# Patient Record
Sex: FEMALE | Race: WHITE | NOT HISPANIC OR LATINO | Employment: FULL TIME | URBAN - METROPOLITAN AREA
[De-identification: names, ages, dates, MRNs, and addresses within clinical notes are randomized per-mention and may not be internally consistent; named-entity substitution may affect disease eponyms.]

---

## 2019-10-21 PROBLEM — G47.01 INSOMNIA DUE TO MEDICAL CONDITION: Status: ACTIVE | Noted: 2017-07-11

## 2019-10-21 PROBLEM — M47.26 OTHER SPONDYLOSIS WITH RADICULOPATHY, LUMBAR REGION: Status: ACTIVE | Noted: 2017-07-11

## 2019-10-21 PROBLEM — F17.200 SMOKING: Status: ACTIVE | Noted: 2017-07-11

## 2019-10-21 PROBLEM — F41.0 PANIC ANXIETY SYNDROME: Status: ACTIVE | Noted: 2017-07-11

## 2019-10-21 PROBLEM — M46.90 INFLAMMATORY SPONDYLOPATHY: Status: ACTIVE | Noted: 2017-07-11

## 2019-10-21 PROBLEM — M51.36 DDD (DEGENERATIVE DISC DISEASE), LUMBAR: Status: ACTIVE | Noted: 2017-07-11

## 2019-10-21 PROBLEM — M51.369 DDD (DEGENERATIVE DISC DISEASE), LUMBAR: Status: ACTIVE | Noted: 2017-07-11

## 2019-10-22 ENCOUNTER — OFFICE VISIT (OUTPATIENT)
Dept: FAMILY MEDICINE | Facility: CLINIC | Age: 59
End: 2019-10-22

## 2019-10-22 VITALS
DIASTOLIC BLOOD PRESSURE: 64 MMHG | HEART RATE: 72 BPM | SYSTOLIC BLOOD PRESSURE: 114 MMHG | HEIGHT: 64 IN | WEIGHT: 142 LBS | BODY MASS INDEX: 24.24 KG/M2

## 2019-10-22 DIAGNOSIS — F41.0 PANIC ANXIETY SYNDROME: Primary | ICD-10-CM

## 2019-10-22 DIAGNOSIS — M46.92 INFLAMMATORY SPONDYLOPATHY OF CERVICAL REGION: ICD-10-CM

## 2019-10-22 DIAGNOSIS — M47.26 OTHER SPONDYLOSIS WITH RADICULOPATHY, LUMBAR REGION: ICD-10-CM

## 2019-10-22 PROBLEM — M47.817 LUMBOSACRAL SPONDYLOSIS WITHOUT MYELOPATHY: Status: ACTIVE | Noted: 2019-10-22

## 2019-10-22 PROCEDURE — 99212 OFFICE O/P EST SF 10 MIN: CPT | Mod: S$GLB,,, | Performed by: FAMILY MEDICINE

## 2019-10-22 PROCEDURE — 99212 PR OFFICE/OUTPT VISIT, EST, LEVL II, 10-19 MIN: ICD-10-PCS | Mod: S$GLB,,, | Performed by: FAMILY MEDICINE

## 2019-10-22 RX ORDER — ALPRAZOLAM 0.5 MG/1
0.5 TABLET ORAL 2 TIMES DAILY PRN
Qty: 60 TABLET | Refills: 5 | Status: SHIPPED | OUTPATIENT
Start: 2019-10-22 | End: 2020-04-28 | Stop reason: SDUPTHER

## 2019-10-22 RX ORDER — ALPRAZOLAM 0.5 MG/1
1 TABLET ORAL 2 TIMES DAILY PRN
Refills: 0 | COMMUNITY
Start: 2019-10-04 | End: 2019-10-22 | Stop reason: SDUPTHER

## 2019-10-22 NOTE — PROGRESS NOTES
"  SUBJECTIVE:    Patient ID: Mily Long is a 59 y.o. female.    Chief Complaint: Regular Check Up    Patient with DDD and insomnia presents for med refill. Has managed to remain tobacco free for the past 11 months. Notes some weight gain during this time  Uses xanax for sleep and ibuprofen for back and neck pain. Has been taking less NSAIDs because she is able to manage her activities better. Does have low back stiffness. nexk has improves with chnaging her pillows an sleep positioning      Past Medical History:   Diagnosis Date    DDD (degenerative disc disease), cervical     DDD (degenerative disc disease), lumbar     Insomnia     Panic anxiety syndrome     Spondyloarthritis      Past Surgical History:   Procedure Laterality Date     SECTION       Family History   Problem Relation Age of Onset    Diabetes Father        Marital Status: Single  Alcohol History:  reports that she drank alcohol.  Tobacco History:  reports that she has quit smoking. She has never used smokeless tobacco.  Drug History:  reports that she does not use drugs.    Review of patient's allergies indicates:  No Known Allergies    Current Outpatient Medications:     ALPRAZolam (XANAX) 0.5 MG tablet, Take 1 tablet (0.5 mg total) by mouth 2 (two) times daily as needed., Disp: 60 tablet, Rfl: 5    Review of Systems   HENT: Negative.    Respiratory: Negative for cough and shortness of breath.    Cardiovascular: Positive for palpitations. Negative for chest pain.   Gastrointestinal: Negative.    Musculoskeletal: Positive for back pain.   Psychiatric/Behavioral: Positive for sleep disturbance. The patient is nervous/anxious.           Objective:      Vitals:    10/22/19 1440   BP: 114/64   Pulse: 72   Weight: 64.4 kg (142 lb)   Height: 5' 3.5" (1.613 m)     Physical Exam   Constitutional: She is oriented to person, place, and time. She appears well-developed and well-nourished.   HENT:   Head: Normocephalic and atraumatic.   Eyes: " Pupils are equal, round, and reactive to light. EOM are normal.   Neck: Normal range of motion.   Cardiovascular: Normal rate.   Pulmonary/Chest: Effort normal.   Musculoskeletal: Normal range of motion. She exhibits no edema.   Neurological: She is alert and oriented to person, place, and time.   Skin: Skin is warm and dry.   Psychiatric: She has a normal mood and affect.   Vitals reviewed.        Assessment:       1. Panic anxiety syndrome    2. Inflammatory spondylopathy of cervical region    3. Other spondylosis with radiculopathy, lumbar region         Plan:       Panic anxiety syndrome  -     ALPRAZolam (XANAX) 0.5 MG tablet; Take 1 tablet (0.5 mg total) by mouth 2 (two) times daily as needed.  Dispense: 60 tablet; Refill: 5    Inflammatory spondylopathy of cervical region    Other spondylosis with radiculopathy, lumbar region     Continue current, continue abstinence from tobacco.  Follow up in about 6 months (around 4/22/2020).

## 2020-03-24 ENCOUNTER — TELEPHONE (OUTPATIENT)
Dept: FAMILY MEDICINE | Facility: CLINIC | Age: 60
End: 2020-03-24

## 2020-03-24 NOTE — TELEPHONE ENCOUNTER
----- Message from Cely Read sent at 3/23/2020  9:52 AM CDT -----  Pt was to know should she come to her April 22 appt for refills or not with everything going on   Pt [phone]

## 2020-03-24 NOTE — TELEPHONE ENCOUNTER
Spoke with patient states she is not interested in rescheduling, and is not able to do a virtual visit.  Requests to keep visit as scheduled.  Informed will call if appt needs to be rescheduled due to covid concerns -DN

## 2020-04-28 DIAGNOSIS — F41.0 PANIC ANXIETY SYNDROME: ICD-10-CM

## 2020-04-28 RX ORDER — ALPRAZOLAM 0.5 MG/1
0.5 TABLET ORAL 2 TIMES DAILY PRN
Qty: 60 TABLET | Refills: 5 | Status: SHIPPED | OUTPATIENT
Start: 2020-04-28 | End: 2020-10-14 | Stop reason: SDUPTHER

## 2020-04-28 NOTE — TELEPHONE ENCOUNTER
----- Message from Cely Read sent at 4/28/2020 11:40 AM CDT -----  Pt appt was r.s to June due to the virus needing refills on alprazolam   Pharm [facility]   Pt [phone]

## 2020-06-16 ENCOUNTER — OFFICE VISIT (OUTPATIENT)
Dept: FAMILY MEDICINE | Facility: CLINIC | Age: 60
End: 2020-06-16

## 2020-06-16 VITALS
HEART RATE: 88 BPM | DIASTOLIC BLOOD PRESSURE: 72 MMHG | SYSTOLIC BLOOD PRESSURE: 112 MMHG | TEMPERATURE: 99 F | HEIGHT: 64 IN | WEIGHT: 151 LBS | BODY MASS INDEX: 25.78 KG/M2

## 2020-06-16 DIAGNOSIS — G47.01 INSOMNIA DUE TO MEDICAL CONDITION: Primary | ICD-10-CM

## 2020-06-16 DIAGNOSIS — M47.817 LUMBOSACRAL SPONDYLOSIS WITHOUT MYELOPATHY: ICD-10-CM

## 2020-06-16 PROBLEM — F17.200 SMOKING: Status: RESOLVED | Noted: 2017-07-11 | Resolved: 2020-06-16

## 2020-06-16 PROCEDURE — 99212 PR OFFICE/OUTPT VISIT, EST, LEVL II, 10-19 MIN: ICD-10-PCS | Mod: S$GLB,,, | Performed by: FAMILY MEDICINE

## 2020-06-16 PROCEDURE — 99212 OFFICE O/P EST SF 10 MIN: CPT | Mod: S$GLB,,, | Performed by: FAMILY MEDICINE

## 2020-06-16 NOTE — PROGRESS NOTES
"  SUBJECTIVE:    Patient ID: Mily Long is a 60 y.o. female.    Chief Complaint: Regular Check Up    Patient has been working out routinely and keep a steady weight.  Notes some issues with bloating after she eats. No bowel issuse no heart burn.   Has started taking her whole xanax at [facility]. Has been working on her anxiety during the carona  Aches and pains have improved       Past Medical History:   Diagnosis Date    DDD (degenerative disc disease), cervical     DDD (degenerative disc disease), lumbar     Insomnia     Panic anxiety syndrome     Spondyloarthritis      Past Surgical History:   Procedure Laterality Date     SECTION       Family History   Problem Relation Age of Onset    Diabetes Father        Marital Status: Single  Alcohol History:  reports previous alcohol use.  Tobacco History:  reports that she has quit smoking. She has never used smokeless tobacco.  Drug History:  reports no history of drug use.    Review of patient's allergies indicates:  No Known Allergies    Current Outpatient Medications:     ALPRAZolam (XANAX) 0.5 MG tablet, Take 1 tablet (0.5 mg total) by mouth 2 (two) times daily as needed., Disp: 60 tablet, Rfl: 5    valACYclovir (VALTREX) 1000 MG tablet, Take 1 tablet (1,000 mg total) by mouth 3 (three) times daily. for 7 days, Disp: 21 tablet, Rfl: 0    Review of Systems   All other systems reviewed and are negative.         Objective:      Vitals:    20 1520   BP: 112/72   Pulse: 88   Temp: 99 °F (37.2 °C)   Weight: 68.5 kg (151 lb)   Height: 5' 3.5" (1.613 m)     Physical Exam  Vitals signs reviewed.   Constitutional:       General: She is not in acute distress.     Appearance: She is well-developed.   HENT:      Head: Normocephalic and atraumatic.      Right Ear: Tympanic membrane and external ear normal.      Left Ear: Tympanic membrane and external ear normal.   Eyes:      General: Lids are normal.      Conjunctiva/sclera: Conjunctivae normal.      Pupils: " Pupils are equal, round, and reactive to light.   Neck:      Musculoskeletal: Full passive range of motion without pain and neck supple.      Thyroid: No thyromegaly.      Vascular: No JVD.      Trachea: No tracheal deviation.   Cardiovascular:      Rate and Rhythm: Normal rate and regular rhythm.      Chest Wall: PMI is not displaced.   Pulmonary:      Effort: Pulmonary effort is normal.      Breath sounds: Normal breath sounds.   Abdominal:      General: Bowel sounds are normal.      Palpations: Abdomen is soft.      Tenderness: There is no abdominal tenderness. There is no guarding or rebound.   Musculoskeletal: Normal range of motion.         General: No tenderness.   Skin:     General: Skin is warm and dry.      Findings: No rash.   Neurological:      Mental Status: She is alert and oriented to person, place, and time.           Assessment:       1. Insomnia due to medical condition    2. Lumbosacral spondylosis without myelopathy         Plan:       Insomnia due to medical condition    Lumbosacral spondylosis without myelopathy      Follow up if symptoms worsen or fail to improve.

## 2020-10-14 DIAGNOSIS — F41.0 PANIC ANXIETY SYNDROME: ICD-10-CM

## 2020-10-14 RX ORDER — ALPRAZOLAM 0.5 MG/1
0.5 TABLET ORAL 2 TIMES DAILY PRN
Qty: 60 TABLET | Refills: 5 | Status: SHIPPED | OUTPATIENT
Start: 2020-10-14 | End: 2021-04-07 | Stop reason: SDUPTHER

## 2020-10-14 NOTE — TELEPHONE ENCOUNTER
----- Message from Cely Read sent at 10/14/2020 12:22 PM CDT -----  Regarding: refills  Alprazolam  [facility]   Pt [phone]

## 2020-10-14 NOTE — TELEPHONE ENCOUNTER
prescription sent to   [id number] - [place], [place] - [address]  AT [facility]  [address] [facility] [address]  Phone: [phone] Fax: [phone]

## 2020-11-13 ENCOUNTER — TELEPHONE (OUTPATIENT)
Dept: FAMILY MEDICINE | Facility: CLINIC | Age: 60
End: 2020-11-13

## 2020-11-13 NOTE — TELEPHONE ENCOUNTER
Spoke with pt, pt stated that she is stressed and her hair is falling out. Pt wants her xanax to be increased. Pt was offered an appointment and labs but refused. Please advise.

## 2020-11-13 NOTE — TELEPHONE ENCOUNTER
----- Message from Prudence Bhandari sent at 11/12/2020 10:12 AM CST -----  VM 10:05   She needs a call about her prescription PT'S # [phone] GH

## 2021-03-17 ENCOUNTER — HOSPITAL ENCOUNTER (EMERGENCY)
Facility: HOSPITAL | Age: 61
Discharge: HOME | End: 2021-03-17
Attending: EMERGENCY MEDICINE
Payer: MEDICAID

## 2021-03-17 VITALS
DIASTOLIC BLOOD PRESSURE: 79 MMHG | TEMPERATURE: 98 F | HEART RATE: 53 BPM | HEIGHT: 63 IN | OXYGEN SATURATION: 99 % | BODY MASS INDEX: 26.75 KG/M2 | SYSTOLIC BLOOD PRESSURE: 121 MMHG | WEIGHT: 151 LBS

## 2021-03-17 DIAGNOSIS — G51.0 BELL'S PALSY: Primary | ICD-10-CM

## 2021-03-17 LAB
ALBUMIN SERPL BCP-MCNC: 3.7 G/DL (ref 3.5–5.2)
ALP SERPL-CCNC: 95 U/L (ref 55–135)
ALT SERPL W/O P-5'-P-CCNC: 17 U/L (ref 10–44)
ANION GAP SERPL CALC-SCNC: 10 MMOL/L (ref 8–16)
AST SERPL-CCNC: 21 U/L (ref 10–40)
BASOPHILS # BLD AUTO: 0.07 K/UL (ref 0–0.2)
BASOPHILS NFR BLD: 1.2 % (ref 0–1.9)
BILIRUB SERPL-MCNC: 0.7 MG/DL (ref 0.1–1)
BILIRUB UR QL STRIP: NEGATIVE
BUN SERPL-MCNC: 13 MG/DL (ref 6–20)
CALCIUM SERPL-MCNC: 8.4 MG/DL (ref 8.7–10.5)
CHLORIDE SERPL-SCNC: 107 MMOL/L (ref 95–110)
CLARITY UR: CLEAR
CO2 SERPL-SCNC: 21 MMOL/L (ref 23–29)
COLOR UR: YELLOW
CREAT SERPL-MCNC: 0.9 MG/DL (ref 0.5–1.4)
DIFFERENTIAL METHOD: ABNORMAL
EOSINOPHIL # BLD AUTO: 0.2 K/UL (ref 0–0.5)
EOSINOPHIL NFR BLD: 3.6 % (ref 0–8)
ERYTHROCYTE [DISTWIDTH] IN BLOOD BY AUTOMATED COUNT: 12.5 % (ref 11.5–14.5)
EST. GFR  (AFRICAN AMERICAN): >60 ML/MIN/1.73 M^2
EST. GFR  (NON AFRICAN AMERICAN): >60 ML/MIN/1.73 M^2
GLUCOSE SERPL-MCNC: 122 MG/DL (ref 70–110)
GLUCOSE UR QL STRIP: NEGATIVE
HCT VFR BLD AUTO: 38.6 % (ref 37–48.5)
HGB BLD-MCNC: 12.6 G/DL (ref 12–16)
HGB UR QL STRIP: NEGATIVE
IMM GRANULOCYTES # BLD AUTO: 0.02 K/UL (ref 0–0.04)
IMM GRANULOCYTES NFR BLD AUTO: 0.3 % (ref 0–0.5)
KETONES UR QL STRIP: NEGATIVE
LEUKOCYTE ESTERASE UR QL STRIP: NEGATIVE
LYMPHOCYTES # BLD AUTO: 3.1 K/UL (ref 1–4.8)
LYMPHOCYTES NFR BLD: 53.3 % (ref 18–48)
MCH RBC QN AUTO: 28.3 PG (ref 27–31)
MCHC RBC AUTO-ENTMCNC: 32.6 G/DL (ref 32–36)
MCV RBC AUTO: 87 FL (ref 82–98)
MONOCYTES # BLD AUTO: 0.7 K/UL (ref 0.3–1)
MONOCYTES NFR BLD: 12.5 % (ref 4–15)
NEUTROPHILS # BLD AUTO: 1.7 K/UL (ref 1.8–7.7)
NEUTROPHILS NFR BLD: 29.1 % (ref 38–73)
NITRITE UR QL STRIP: NEGATIVE
NRBC BLD-RTO: 0 /100 WBC
PH UR STRIP: 6 [PH] (ref 5–8)
PLATELET # BLD AUTO: 259 K/UL (ref 150–350)
PMV BLD AUTO: 9.9 FL (ref 9.2–12.9)
POCT GLUCOSE: 111 MG/DL (ref 70–110)
POTASSIUM SERPL-SCNC: 3.7 MMOL/L (ref 3.5–5.1)
PROT SERPL-MCNC: 7 G/DL (ref 6–8.4)
PROT UR QL STRIP: NEGATIVE
RBC # BLD AUTO: 4.45 M/UL (ref 4–5.4)
SODIUM SERPL-SCNC: 138 MMOL/L (ref 136–145)
SP GR UR STRIP: <=1.005 (ref 1–1.03)
T4 FREE SERPL-MCNC: 0.91 NG/DL (ref 0.71–1.51)
TSH SERPL DL<=0.005 MIU/L-ACNC: 6.16 UIU/ML (ref 0.4–4)
URN SPEC COLLECT METH UR: ABNORMAL
UROBILINOGEN UR STRIP-ACNC: NEGATIVE EU/DL
WBC # BLD AUTO: 5.78 K/UL (ref 3.9–12.7)

## 2021-03-17 PROCEDURE — 80053 COMPREHEN METABOLIC PANEL: CPT | Performed by: EMERGENCY MEDICINE

## 2021-03-17 PROCEDURE — 36415 COLL VENOUS BLD VENIPUNCTURE: CPT | Performed by: EMERGENCY MEDICINE

## 2021-03-17 PROCEDURE — 85025 COMPLETE CBC W/AUTO DIFF WBC: CPT | Performed by: EMERGENCY MEDICINE

## 2021-03-17 PROCEDURE — 82962 GLUCOSE BLOOD TEST: CPT

## 2021-03-17 PROCEDURE — 84443 ASSAY THYROID STIM HORMONE: CPT | Performed by: EMERGENCY MEDICINE

## 2021-03-17 PROCEDURE — 84439 ASSAY OF FREE THYROXINE: CPT | Performed by: EMERGENCY MEDICINE

## 2021-03-17 PROCEDURE — 86592 SYPHILIS TEST NON-TREP QUAL: CPT | Performed by: EMERGENCY MEDICINE

## 2021-03-17 PROCEDURE — 81003 URINALYSIS AUTO W/O SCOPE: CPT | Performed by: EMERGENCY MEDICINE

## 2021-03-17 PROCEDURE — 99284 EMERGENCY DEPT VISIT MOD MDM: CPT

## 2021-03-17 RX ORDER — PREDNISONE 20 MG/1
60 TABLET ORAL DAILY
Qty: 15 TABLET | Refills: 0 | Status: SHIPPED | OUTPATIENT
Start: 2021-03-17 | End: 2021-03-22

## 2021-03-17 RX ORDER — VALACYCLOVIR HYDROCHLORIDE 1 G/1
1000 TABLET, FILM COATED ORAL 3 TIMES DAILY
Qty: 21 TABLET | Refills: 0 | Status: SHIPPED | OUTPATIENT
Start: 2021-03-17 | End: 2021-06-17

## 2021-03-18 LAB — RPR SER QL: NORMAL

## 2021-04-07 DIAGNOSIS — F41.0 PANIC ANXIETY SYNDROME: ICD-10-CM

## 2021-04-07 RX ORDER — ALPRAZOLAM 0.5 MG/1
0.5 TABLET ORAL 2 TIMES DAILY PRN
Qty: 60 TABLET | Refills: 2 | Status: SHIPPED | OUTPATIENT
Start: 2021-04-07 | End: 2021-06-17 | Stop reason: SDUPTHER

## 2021-06-17 ENCOUNTER — OFFICE VISIT (OUTPATIENT)
Dept: FAMILY MEDICINE | Facility: CLINIC | Age: 61
End: 2021-06-17
Payer: MEDICAID

## 2021-06-17 VITALS
HEIGHT: 63 IN | SYSTOLIC BLOOD PRESSURE: 134 MMHG | WEIGHT: 150 LBS | HEART RATE: 63 BPM | DIASTOLIC BLOOD PRESSURE: 90 MMHG | BODY MASS INDEX: 26.58 KG/M2

## 2021-06-17 DIAGNOSIS — Z79.899 ENCOUNTER FOR LONG-TERM CURRENT USE OF HIGH RISK MEDICATION: ICD-10-CM

## 2021-06-17 DIAGNOSIS — Z11.59 ENCOUNTER FOR HEPATITIS C SCREENING TEST FOR LOW RISK PATIENT: ICD-10-CM

## 2021-06-17 DIAGNOSIS — Z00.01 ANNUAL VISIT FOR GENERAL ADULT MEDICAL EXAMINATION WITH ABNORMAL FINDINGS: Primary | ICD-10-CM

## 2021-06-17 DIAGNOSIS — R79.89 ABNORMAL TSH: ICD-10-CM

## 2021-06-17 DIAGNOSIS — Z13.6 SCREENING FOR ISCHEMIC HEART DISEASE (IHD): ICD-10-CM

## 2021-06-17 DIAGNOSIS — Z11.4 SCREENING FOR HIV WITHOUT PRESENCE OF RISK FACTORS: ICD-10-CM

## 2021-06-17 DIAGNOSIS — M47.817 LUMBOSACRAL SPONDYLOSIS WITHOUT MYELOPATHY: ICD-10-CM

## 2021-06-17 DIAGNOSIS — G51.0 BELL'S PALSY: ICD-10-CM

## 2021-06-17 DIAGNOSIS — Z51.81 THERAPEUTIC DRUG MONITORING: ICD-10-CM

## 2021-06-17 DIAGNOSIS — Z12.31 ENCOUNTER FOR SCREENING MAMMOGRAM FOR MALIGNANT NEOPLASM OF BREAST: ICD-10-CM

## 2021-06-17 DIAGNOSIS — F41.0 PANIC ANXIETY SYNDROME: ICD-10-CM

## 2021-06-17 PROCEDURE — 99396 PREV VISIT EST AGE 40-64: CPT | Mod: S$GLB,,, | Performed by: FAMILY MEDICINE

## 2021-06-17 PROCEDURE — 99396 PR PREVENTIVE VISIT,EST,40-64: ICD-10-PCS | Mod: S$GLB,,, | Performed by: FAMILY MEDICINE

## 2021-06-17 RX ORDER — QUETIAPINE FUMARATE 25 MG/1
25 TABLET, FILM COATED ORAL NIGHTLY
Qty: 30 TABLET | Refills: 5 | Status: SHIPPED | OUTPATIENT
Start: 2021-06-17 | End: 2021-06-30 | Stop reason: SINTOL

## 2021-06-17 RX ORDER — ALPRAZOLAM 0.5 MG/1
0.5 TABLET ORAL 2 TIMES DAILY PRN
Qty: 60 TABLET | Refills: 5 | Status: SHIPPED | OUTPATIENT
Start: 2021-06-17 | End: 2021-12-07 | Stop reason: SDUPTHER

## 2021-06-21 LAB
ALBUMIN SERPL-MCNC: 3.9 G/DL (ref 3.6–5.1)
ALBUMIN/GLOB SERPL: 1.5 (CALC) (ref 1–2.5)
ALP SERPL-CCNC: 80 U/L (ref 37–153)
ALT SERPL-CCNC: 13 U/L (ref 6–29)
APPEARANCE UR: CLEAR
AST SERPL-CCNC: 20 U/L (ref 10–35)
BASOPHILS # BLD AUTO: 29 CELLS/UL (ref 0–200)
BASOPHILS NFR BLD AUTO: 0.6 %
BILIRUB SERPL-MCNC: 0.6 MG/DL (ref 0.2–1.2)
BILIRUB UR QL STRIP: NEGATIVE
BUN SERPL-MCNC: 10 MG/DL (ref 7–25)
BUN/CREAT SERPL: NORMAL (CALC) (ref 6–22)
CALCIUM SERPL-MCNC: 8.8 MG/DL (ref 8.6–10.4)
CHLORIDE SERPL-SCNC: 103 MMOL/L (ref 98–110)
CHOLEST SERPL-MCNC: 202 MG/DL
CHOLEST/HDLC SERPL: 3.3 (CALC)
CO2 SERPL-SCNC: 29 MMOL/L (ref 20–32)
COLOR UR: YELLOW
CREAT SERPL-MCNC: 0.87 MG/DL (ref 0.5–0.99)
EOSINOPHIL # BLD AUTO: 78 CELLS/UL (ref 15–500)
EOSINOPHIL NFR BLD AUTO: 1.6 %
ERYTHROCYTE [DISTWIDTH] IN BLOOD BY AUTOMATED COUNT: 12.9 % (ref 11–15)
GLOBULIN SER CALC-MCNC: 2.6 G/DL (CALC) (ref 1.9–3.7)
GLUCOSE SERPL-MCNC: 90 MG/DL (ref 65–99)
GLUCOSE UR QL STRIP: NEGATIVE
HCT VFR BLD AUTO: 39.9 % (ref 35–45)
HCV AB S/CO SERPL IA: 0.02
HCV AB SERPL QL IA: NORMAL
HDLC SERPL-MCNC: 61 MG/DL
HGB BLD-MCNC: 12.8 G/DL (ref 11.7–15.5)
HGB UR QL STRIP: NEGATIVE
HIV 1+2 AB+HIV1 P24 AG SERPL QL IA: NORMAL
KETONES UR QL STRIP: NEGATIVE
LDLC SERPL CALC-MCNC: 125 MG/DL (CALC)
LEUKOCYTE ESTERASE UR QL STRIP: NEGATIVE
LYMPHOCYTES # BLD AUTO: 1823 CELLS/UL (ref 850–3900)
LYMPHOCYTES NFR BLD AUTO: 37.2 %
MCH RBC QN AUTO: 28.1 PG (ref 27–33)
MCHC RBC AUTO-ENTMCNC: 32.1 G/DL (ref 32–36)
MCV RBC AUTO: 87.7 FL (ref 80–100)
MONOCYTES # BLD AUTO: 578 CELLS/UL (ref 200–950)
MONOCYTES NFR BLD AUTO: 11.8 %
NEUTROPHILS # BLD AUTO: 2391 CELLS/UL (ref 1500–7800)
NEUTROPHILS NFR BLD AUTO: 48.8 %
NITRITE UR QL STRIP: NEGATIVE
NONHDLC SERPL-MCNC: 141 MG/DL (CALC)
PH UR STRIP: 6 [PH] (ref 5–8)
PLATELET # BLD AUTO: 261 THOUSAND/UL (ref 140–400)
PMV BLD REES-ECKER: 10.3 FL (ref 7.5–12.5)
POTASSIUM SERPL-SCNC: 4.2 MMOL/L (ref 3.5–5.3)
PROT SERPL-MCNC: 6.5 G/DL (ref 6.1–8.1)
PROT UR QL STRIP: NEGATIVE
RBC # BLD AUTO: 4.55 MILLION/UL (ref 3.8–5.1)
SODIUM SERPL-SCNC: 138 MMOL/L (ref 135–146)
SP GR UR STRIP: 1 (ref 1–1.03)
TRIGL SERPL-MCNC: 65 MG/DL
TSH SERPL-ACNC: 3.34 MIU/L (ref 0.4–4.5)
WBC # BLD AUTO: 4.9 THOUSAND/UL (ref 3.8–10.8)

## 2021-06-23 ENCOUNTER — TELEPHONE (OUTPATIENT)
Dept: FAMILY MEDICINE | Facility: CLINIC | Age: 61
End: 2021-06-23

## 2021-06-30 ENCOUNTER — TELEPHONE (OUTPATIENT)
Dept: FAMILY MEDICINE | Facility: CLINIC | Age: 61
End: 2021-06-30

## 2021-12-07 DIAGNOSIS — F41.0 PANIC ANXIETY SYNDROME: ICD-10-CM

## 2021-12-07 RX ORDER — ALPRAZOLAM 0.5 MG/1
0.5 TABLET ORAL 2 TIMES DAILY PRN
Qty: 60 TABLET | Refills: 1 | Status: SHIPPED | OUTPATIENT
Start: 2021-12-07 | End: 2022-01-13 | Stop reason: SDUPTHER

## 2021-12-27 ENCOUNTER — TELEPHONE (OUTPATIENT)
Dept: FAMILY MEDICINE | Facility: CLINIC | Age: 61
End: 2021-12-27
Payer: MEDICAID

## 2021-12-30 ENCOUNTER — TELEPHONE (OUTPATIENT)
Dept: FAMILY MEDICINE | Facility: CLINIC | Age: 61
End: 2021-12-30
Payer: MEDICAID

## 2022-01-13 DIAGNOSIS — F41.0 PANIC ANXIETY SYNDROME: ICD-10-CM

## 2022-01-13 RX ORDER — ALPRAZOLAM 0.5 MG/1
0.5 TABLET ORAL 2 TIMES DAILY PRN
Qty: 60 TABLET | Refills: 0 | Status: SHIPPED | OUTPATIENT
Start: 2022-01-13 | End: 2022-01-24 | Stop reason: SDUPTHER

## 2022-01-13 NOTE — TELEPHONE ENCOUNTER
Spoke with patient visit rescheduled to 1/24/22.  Patient verbalized understanding to appt time/date.  Wants to discuss xanax increase at visit, but requests refill as is for now.  rx pended, to MD for approval -DN

## 2022-01-13 NOTE — TELEPHONE ENCOUNTER
----- Message from Bonnie Gauthier sent at 1/13/2022  9:07 AM CST -----  Regarding: refills  Pt came is thinking her appointment was for 9 can not come back at 2.  Wants refills for xANAX  6 refills till she can get another apt please call [phone].   [facility]

## 2022-01-24 ENCOUNTER — OFFICE VISIT (OUTPATIENT)
Dept: FAMILY MEDICINE | Facility: CLINIC | Age: 62
End: 2022-01-24
Payer: MEDICAID

## 2022-01-24 VITALS
HEIGHT: 63 IN | SYSTOLIC BLOOD PRESSURE: 124 MMHG | BODY MASS INDEX: 27.29 KG/M2 | DIASTOLIC BLOOD PRESSURE: 86 MMHG | HEART RATE: 70 BPM | WEIGHT: 154 LBS

## 2022-01-24 DIAGNOSIS — M51.36 DDD (DEGENERATIVE DISC DISEASE), LUMBAR: ICD-10-CM

## 2022-01-24 DIAGNOSIS — Z12.31 ENCOUNTER FOR SCREENING MAMMOGRAM FOR MALIGNANT NEOPLASM OF BREAST: ICD-10-CM

## 2022-01-24 DIAGNOSIS — F41.0 PANIC ANXIETY SYNDROME: Primary | ICD-10-CM

## 2022-01-24 DIAGNOSIS — Z12.11 COLON CANCER SCREENING: ICD-10-CM

## 2022-01-24 PROCEDURE — 3074F SYST BP LT 130 MM HG: CPT | Mod: S$GLB,,, | Performed by: FAMILY MEDICINE

## 2022-01-24 PROCEDURE — 3079F PR MOST RECENT DIASTOLIC BLOOD PRESSURE 80-89 MM HG: ICD-10-PCS | Mod: S$GLB,,, | Performed by: FAMILY MEDICINE

## 2022-01-24 PROCEDURE — 99213 OFFICE O/P EST LOW 20 MIN: CPT | Mod: S$GLB,,, | Performed by: FAMILY MEDICINE

## 2022-01-24 PROCEDURE — 3008F PR BODY MASS INDEX (BMI) DOCUMENTED: ICD-10-PCS | Mod: S$GLB,,, | Performed by: FAMILY MEDICINE

## 2022-01-24 PROCEDURE — 3074F PR MOST RECENT SYSTOLIC BLOOD PRESSURE < 130 MM HG: ICD-10-PCS | Mod: S$GLB,,, | Performed by: FAMILY MEDICINE

## 2022-01-24 PROCEDURE — 99213 PR OFFICE/OUTPT VISIT, EST, LEVL III, 20-29 MIN: ICD-10-PCS | Mod: S$GLB,,, | Performed by: FAMILY MEDICINE

## 2022-01-24 PROCEDURE — 3008F BODY MASS INDEX DOCD: CPT | Mod: S$GLB,,, | Performed by: FAMILY MEDICINE

## 2022-01-24 PROCEDURE — 3079F DIAST BP 80-89 MM HG: CPT | Mod: S$GLB,,, | Performed by: FAMILY MEDICINE

## 2022-01-24 RX ORDER — BUPROPION HYDROCHLORIDE 150 MG/1
150 TABLET ORAL DAILY
Qty: 30 TABLET | Refills: 6 | Status: SHIPPED | OUTPATIENT
Start: 2022-01-24 | End: 2022-07-25

## 2022-01-24 RX ORDER — ALPRAZOLAM 0.5 MG/1
0.5 TABLET ORAL 2 TIMES DAILY PRN
Qty: 60 TABLET | Refills: 5 | Status: SHIPPED | OUTPATIENT
Start: 2022-02-12 | End: 2022-07-19 | Stop reason: SDUPTHER

## 2022-01-24 NOTE — PROGRESS NOTES
"  SUBJECTIVE:    Patient ID: Mily Long is a 61 y.o. female.    Chief Complaint: Follow-up (Discuss med increase, C-scope declined, FOBT declined// SW)    Patient having issues with sleep due to rotating schedule for late evening and overnight   Has issues as well with anxiety and has been biting her nails since she has stopped smoking 4 years ago  Reports irratic eating   You do health screenings but declines immunizations.      Past Medical History:   Diagnosis Date    DDD (degenerative disc disease), cervical     DDD (degenerative disc disease), lumbar     Insomnia     Panic anxiety syndrome     Spondyloarthritis      Past Surgical History:   Procedure Laterality Date     SECTION       Family History   Problem Relation Age of Onset    Diabetes Father        Marital Status: Single  Alcohol History:  reports previous alcohol use.  Tobacco History:  reports that she has quit smoking. She has never used smokeless tobacco.  Drug History:  reports no history of drug use.    Review of patient's allergies indicates:   Allergen Reactions    Codeine     Hydrocodone        Current Outpatient Medications:     [START ON 2022] ALPRAZolam (XANAX) 0.5 MG tablet, Take 1 tablet (0.5 mg total) by mouth 2 (two) times daily as needed for Anxiety., Disp: 60 tablet, Rfl: 5    buPROPion (WELLBUTRIN XL) 150 MG TB24 tablet, Take 1 tablet (150 mg total) by mouth once daily. For anxiety, Disp: 30 tablet, Rfl: 6    Review of Systems   All other systems reviewed and are negative.         Objective:      Vitals:    22 0839   BP: 124/86   Pulse: 70   Weight: 69.9 kg (154 lb)   Height: 5' 3" (1.6 m)     Physical Exam  Vitals reviewed.   Constitutional:       General: She is not in acute distress.     Appearance: Normal appearance. She is well-developed.   HENT:      Head: Normocephalic and atraumatic.      Right Ear: External ear normal.      Left Ear: External ear normal.      Nose: Nose normal.      " Mouth/Throat:      Mouth: Mucous membranes are moist.   Eyes:      General: Lids are normal.      Conjunctiva/sclera: Conjunctivae normal.      Pupils: Pupils are equal, round, and reactive to light.   Neck:      Thyroid: No thyromegaly.      Vascular: No JVD.      Trachea: No tracheal deviation.   Cardiovascular:      Rate and Rhythm: Normal rate and regular rhythm.      Chest Wall: PMI is not displaced.      Pulses: Normal pulses.      Heart sounds: Normal heart sounds.   Pulmonary:      Effort: Pulmonary effort is normal.      Breath sounds: Normal breath sounds.   Abdominal:      General: Bowel sounds are normal.      Palpations: Abdomen is soft.      Tenderness: There is no abdominal tenderness. There is no guarding or rebound.   Musculoskeletal:         General: No tenderness. Normal range of motion.      Cervical back: Full passive range of motion without pain and neck supple.   Skin:     General: Skin is warm and dry.      Findings: No rash.   Neurological:      General: No focal deficit present.      Mental Status: She is alert and oriented to person, place, and time.   Psychiatric:         Mood and Affect: Mood normal.         Behavior: Behavior normal.           Assessment:       1. Panic anxiety syndrome    2. DDD (degenerative disc disease), lumbar    3. Encounter for screening mammogram for malignant neoplasm of breast    4. Colon cancer screening         Plan:       Panic anxiety syndrome  -     buPROPion (WELLBUTRIN XL) 150 MG TB24 tablet; Take 1 tablet (150 mg total) by mouth once daily. For anxiety  Dispense: 30 tablet; Refill: 6  -     ALPRAZolam (XANAX) 0.5 MG tablet; Take 1 tablet (0.5 mg total) by mouth 2 (two) times daily as needed for Anxiety.  Dispense: 60 tablet; Refill: 5    DDD (degenerative disc disease), lumbar    Encounter for screening mammogram for malignant neoplasm of breast  -     Mammo Digital Screening Bilat; Future; Expected date: 01/24/2022    Colon cancer screening  -      Cologuard Screening (Multitarget Stool DNA); Future; Expected date: 01/24/2022      Follow up in about 6 months (around 7/24/2022) for Annual Physical.

## 2022-02-10 LAB — NONINV COLON CA DNA+OCC BLD SCRN STL QL: NEGATIVE

## 2022-07-19 DIAGNOSIS — F41.0 PANIC ANXIETY SYNDROME: ICD-10-CM

## 2022-07-19 RX ORDER — ALPRAZOLAM 0.5 MG/1
0.5 TABLET ORAL 2 TIMES DAILY PRN
Qty: 60 TABLET | Refills: 0 | Status: SHIPPED | OUTPATIENT
Start: 2022-07-19 | End: 2022-07-25 | Stop reason: SDUPTHER

## 2022-07-19 NOTE — TELEPHONE ENCOUNTER
----- Message from Kemar Webb MA sent at 7/19/2022 11:45 AM CDT -----  Pt calling for a refill on her alprazolam and she has an appt on Monday.

## 2022-07-25 ENCOUNTER — OFFICE VISIT (OUTPATIENT)
Dept: FAMILY MEDICINE | Facility: CLINIC | Age: 62
End: 2022-07-25
Payer: MEDICAID

## 2022-07-25 VITALS
WEIGHT: 149 LBS | OXYGEN SATURATION: 99 % | DIASTOLIC BLOOD PRESSURE: 86 MMHG | HEART RATE: 58 BPM | BODY MASS INDEX: 26.39 KG/M2 | SYSTOLIC BLOOD PRESSURE: 136 MMHG

## 2022-07-25 DIAGNOSIS — F41.0 PANIC ANXIETY SYNDROME: ICD-10-CM

## 2022-07-25 DIAGNOSIS — Z00.01 ANNUAL VISIT FOR GENERAL ADULT MEDICAL EXAMINATION WITH ABNORMAL FINDINGS: Primary | ICD-10-CM

## 2022-07-25 PROCEDURE — 3075F SYST BP GE 130 - 139MM HG: CPT | Mod: CPTII,S$GLB,, | Performed by: FAMILY MEDICINE

## 2022-07-25 PROCEDURE — 3008F PR BODY MASS INDEX (BMI) DOCUMENTED: ICD-10-PCS | Mod: CPTII,S$GLB,, | Performed by: FAMILY MEDICINE

## 2022-07-25 PROCEDURE — 1160F PR REVIEW ALL MEDS BY PRESCRIBER/CLIN PHARMACIST DOCUMENTED: ICD-10-PCS | Mod: CPTII,S$GLB,, | Performed by: FAMILY MEDICINE

## 2022-07-25 PROCEDURE — 3008F BODY MASS INDEX DOCD: CPT | Mod: CPTII,S$GLB,, | Performed by: FAMILY MEDICINE

## 2022-07-25 PROCEDURE — 99396 PREV VISIT EST AGE 40-64: CPT | Mod: S$GLB,,, | Performed by: FAMILY MEDICINE

## 2022-07-25 PROCEDURE — 1159F PR MEDICATION LIST DOCUMENTED IN MEDICAL RECORD: ICD-10-PCS | Mod: CPTII,S$GLB,, | Performed by: FAMILY MEDICINE

## 2022-07-25 PROCEDURE — 1159F MED LIST DOCD IN RCRD: CPT | Mod: CPTII,S$GLB,, | Performed by: FAMILY MEDICINE

## 2022-07-25 PROCEDURE — 3079F PR MOST RECENT DIASTOLIC BLOOD PRESSURE 80-89 MM HG: ICD-10-PCS | Mod: CPTII,S$GLB,, | Performed by: FAMILY MEDICINE

## 2022-07-25 PROCEDURE — 1160F RVW MEDS BY RX/DR IN RCRD: CPT | Mod: CPTII,S$GLB,, | Performed by: FAMILY MEDICINE

## 2022-07-25 PROCEDURE — 3079F DIAST BP 80-89 MM HG: CPT | Mod: CPTII,S$GLB,, | Performed by: FAMILY MEDICINE

## 2022-07-25 PROCEDURE — 99396 PR PREVENTIVE VISIT,EST,40-64: ICD-10-PCS | Mod: S$GLB,,, | Performed by: FAMILY MEDICINE

## 2022-07-25 PROCEDURE — 3075F PR MOST RECENT SYSTOLIC BLOOD PRESS GE 130-139MM HG: ICD-10-PCS | Mod: CPTII,S$GLB,, | Performed by: FAMILY MEDICINE

## 2022-07-25 RX ORDER — ALPRAZOLAM 0.5 MG/1
0.5 TABLET ORAL 2 TIMES DAILY PRN
Qty: 60 TABLET | Refills: 5 | Status: SHIPPED | OUTPATIENT
Start: 2022-07-25 | End: 2023-01-25 | Stop reason: SDUPTHER

## 2022-07-25 NOTE — PROGRESS NOTES
SUBJECTIVE:   HPI: Mily Long  is a 62 y.o. female who presents for annual physical .   6M Check Up    Patient with BALTAZAR has had some life changes with work and living situation. Continues on xanax daily. Wellbutrin not effective. UTD with colon cancer screening. Has been stable from physical standpoint.   Dental and vision is due. Has not yet done mammogram . Patient has declined cervical cancer screening the past 30 years.     (Not in a hospital admission)    Review of patient's allergies indicates:   Allergen Reactions    Codeine     Hydrocodone      Current Outpatient Medications on File Prior to Visit   Medication Sig Dispense Refill    [DISCONTINUED] ALPRAZolam (XANAX) 0.5 MG tablet Take 1 tablet (0.5 mg total) by mouth 2 (two) times daily as needed for Anxiety. 60 tablet 0    [DISCONTINUED] buPROPion (WELLBUTRIN XL) 150 MG TB24 tablet Take 1 tablet (150 mg total) by mouth once daily. For anxiety (Patient not taking: Reported on 2022) 30 tablet 6     No current facility-administered medications on file prior to visit.     Past Medical History:   Diagnosis Date    DDD (degenerative disc disease), cervical     DDD (degenerative disc disease), lumbar     Insomnia     Panic anxiety syndrome     Spondyloarthritis      Past Surgical History:   Procedure Laterality Date     SECTION       Family History   Problem Relation Age of Onset    Diabetes Father      Social History     Tobacco Use    Smoking status: Former Smoker    Smokeless tobacco: Never Used   Substance Use Topics    Alcohol use: Not Currently    Drug use: Never      Health Maintenance Topics with due status: Not Due       Topic Last Completion Date    Lipid Panel 2021    Colorectal Cancer Screening 2022    Influenza Vaccine Not Due       There is no immunization history on file for this patient.    Review of Systems   Constitutional: Negative for activity change, fatigue and unexpected weight change.   HENT:  Negative for hearing loss, postnasal drip, sinus pressure, sore throat and voice change.    Eyes: Negative for photophobia and visual disturbance.   Respiratory: Negative for cough, shortness of breath and wheezing.    Cardiovascular: Negative for chest pain and palpitations.   Gastrointestinal: Negative for constipation, diarrhea and nausea.   Genitourinary: Negative for difficulty urinating, frequency, hematuria and urgency.   Musculoskeletal: Negative for arthralgias and back pain.   Skin: Negative for rash.   Neurological: Negative for weakness, light-headedness and headaches.   Hematological: Negative for adenopathy. Does not bruise/bleed easily.   Psychiatric/Behavioral: The patient is not nervous/anxious.       OBJECTIVE:      Vitals:    07/25/22 0847   BP: 136/86   Pulse: (!) 58   SpO2: 99%   Weight: 67.6 kg (149 lb)     Physical Exam  Constitutional:       Appearance: Normal appearance.   HENT:      Head: Normocephalic and atraumatic.      Mouth/Throat:      Mouth: Mucous membranes are moist.   Eyes:      Conjunctiva/sclera: Conjunctivae normal.   Pulmonary:      Effort: Pulmonary effort is normal.   Neurological:      General: No focal deficit present.      Mental Status: She is alert and oriented to person, place, and time.   Psychiatric:         Mood and Affect: Mood normal.         Behavior: Behavior normal.        Assessment:       1. Annual visit for general adult medical examination with abnormal findings    2. Panic anxiety syndrome        Plan:       Annual visit for general adult medical examination with abnormal findings  -     CBC Auto Differential; Future; Expected date: 07/25/2022  -     TSH w/reflex to FT4; Future; Expected date: 07/25/2022  -     Comprehensive Metabolic Panel; Future; Expected date: 07/25/2022  -     Lipid Panel; Future; Expected date: 07/25/2022    Panic anxiety syndrome  -     ALPRAZolam (XANAX) 0.5 MG tablet; Take 1 tablet (0.5 mg total) by mouth 2 (two) times daily as  needed for Anxiety.  Dispense: 60 tablet; Refill: 5  -     TSH w/reflex to FT4; Future; Expected date: 07/25/2022        Counseled on age and gender appropriate medical preventative services, including cancer screenings, immunizations, overall nutritional health, need for a consistent exercise regimen and an overall push towards maintaining a vigorous and active lifestyle.      Follow up in about 6 months (around 1/25/2023) for anxiety.

## 2022-07-26 LAB
ALBUMIN SERPL-MCNC: 4 G/DL (ref 3.6–5.1)
ALBUMIN/GLOB SERPL: 1.4 (CALC) (ref 1–2.5)
ALP SERPL-CCNC: 87 U/L (ref 37–153)
ALT SERPL-CCNC: 11 U/L (ref 6–29)
AST SERPL-CCNC: 16 U/L (ref 10–35)
BASOPHILS # BLD AUTO: 53 CELLS/UL (ref 0–200)
BASOPHILS NFR BLD AUTO: 1.1 %
BILIRUB SERPL-MCNC: 0.6 MG/DL (ref 0.2–1.2)
BUN SERPL-MCNC: 13 MG/DL (ref 7–25)
BUN/CREAT SERPL: NORMAL (CALC) (ref 6–22)
CALCIUM SERPL-MCNC: 9 MG/DL (ref 8.6–10.4)
CHLORIDE SERPL-SCNC: 104 MMOL/L (ref 98–110)
CHOLEST SERPL-MCNC: 204 MG/DL
CHOLEST/HDLC SERPL: 3.3 (CALC)
CO2 SERPL-SCNC: 27 MMOL/L (ref 20–32)
CREAT SERPL-MCNC: 0.88 MG/DL (ref 0.5–1.05)
EGFR: 75 ML/MIN/1.73M2
EOSINOPHIL # BLD AUTO: 221 CELLS/UL (ref 15–500)
EOSINOPHIL NFR BLD AUTO: 4.6 %
ERYTHROCYTE [DISTWIDTH] IN BLOOD BY AUTOMATED COUNT: 12.4 % (ref 11–15)
GLOBULIN SER CALC-MCNC: 2.8 G/DL (CALC) (ref 1.9–3.7)
GLUCOSE SERPL-MCNC: 83 MG/DL (ref 65–99)
HCT VFR BLD AUTO: 39.2 % (ref 35–45)
HDLC SERPL-MCNC: 61 MG/DL
HGB BLD-MCNC: 12.4 G/DL (ref 11.7–15.5)
LDLC SERPL CALC-MCNC: 127 MG/DL (CALC)
LYMPHOCYTES # BLD AUTO: 1858 CELLS/UL (ref 850–3900)
LYMPHOCYTES NFR BLD AUTO: 38.7 %
MCH RBC QN AUTO: 27 PG (ref 27–33)
MCHC RBC AUTO-ENTMCNC: 31.6 G/DL (ref 32–36)
MCV RBC AUTO: 85.4 FL (ref 80–100)
MONOCYTES # BLD AUTO: 557 CELLS/UL (ref 200–950)
MONOCYTES NFR BLD AUTO: 11.6 %
NEUTROPHILS # BLD AUTO: 2112 CELLS/UL (ref 1500–7800)
NEUTROPHILS NFR BLD AUTO: 44 %
NONHDLC SERPL-MCNC: 143 MG/DL (CALC)
PLATELET # BLD AUTO: 314 THOUSAND/UL (ref 140–400)
PMV BLD REES-ECKER: 9.9 FL (ref 7.5–12.5)
POTASSIUM SERPL-SCNC: 4.2 MMOL/L (ref 3.5–5.3)
PROT SERPL-MCNC: 6.8 G/DL (ref 6.1–8.1)
RBC # BLD AUTO: 4.59 MILLION/UL (ref 3.8–5.1)
SODIUM SERPL-SCNC: 141 MMOL/L (ref 135–146)
T4 FREE SERPL-MCNC: 1.1 NG/DL (ref 0.8–1.8)
TRIGL SERPL-MCNC: 72 MG/DL
TSH SERPL-ACNC: 5.22 MIU/L (ref 0.4–4.5)
WBC # BLD AUTO: 4.8 THOUSAND/UL (ref 3.8–10.8)

## 2022-08-04 ENCOUNTER — TELEPHONE (OUTPATIENT)
Dept: FAMILY MEDICINE | Facility: CLINIC | Age: 62
End: 2022-08-04

## 2022-08-16 ENCOUNTER — TELEPHONE (OUTPATIENT)
Dept: FAMILY MEDICINE | Facility: CLINIC | Age: 62
End: 2022-08-16

## 2022-08-16 NOTE — TELEPHONE ENCOUNTER
----- Message from Chelsea Valdez MA sent at 8/16/2022  3:54 PM CDT -----  Regarding: refill  Patient needs refills alprazolam .05mg  [facility] [address]  [phone]

## 2023-01-13 ENCOUNTER — TELEPHONE (OUTPATIENT)
Dept: FAMILY MEDICINE | Facility: CLINIC | Age: 63
End: 2023-01-13

## 2023-01-13 DIAGNOSIS — Z51.81 ENCOUNTER FOR THERAPEUTIC DRUG MONITORING: ICD-10-CM

## 2023-01-13 DIAGNOSIS — Z79.899 ENCOUNTER FOR LONG-TERM CURRENT USE OF HIGH RISK MEDICATION: ICD-10-CM

## 2023-01-13 DIAGNOSIS — R79.89 ABNORMAL TSH: Primary | ICD-10-CM

## 2023-01-13 DIAGNOSIS — F41.0 PANIC ANXIETY SYNDROME: ICD-10-CM

## 2023-01-13 NOTE — TELEPHONE ENCOUNTER
----- Message from Saadia Ann sent at 1/13/2023  7:20 AM CST -----  VM 01/12/23 @ 4:50 PM :patient called and would like to know if she need to have labs done before her appointment please give her a call at [phone]

## 2023-01-27 LAB
T4 FREE SERPL-MCNC: 1.1 NG/DL (ref 0.8–1.8)
TSH SERPL-ACNC: 4.98 MIU/L (ref 0.4–4.5)

## 2023-02-07 ENCOUNTER — TELEPHONE (OUTPATIENT)
Dept: FAMILY MEDICINE | Facility: CLINIC | Age: 63
End: 2023-02-07

## 2023-02-16 ENCOUNTER — TELEPHONE (OUTPATIENT)
Dept: FAMILY MEDICINE | Facility: CLINIC | Age: 63
End: 2023-02-16

## 2023-02-16 NOTE — TELEPHONE ENCOUNTER
----- Message from Chelsea Hernandes sent at 2/16/2023  2:42 PM CST -----  Pt calling to get lab results from blood work. [phone]

## 2023-03-23 ENCOUNTER — PATIENT OUTREACH (OUTPATIENT)
Dept: ADMINISTRATIVE | Facility: HOSPITAL | Age: 63
End: 2023-03-23
Payer: MEDICAID

## 2023-03-23 NOTE — LETTER
March 23, 2023    Mily Long  [address] [address]             [facility]  [address]  [place] [address]  Phone: [phone] Dear Ms. Augustinjose francisco,    Your [facility] is dedicated to helping you stay healthy with regular scheduled recommended screenings.  Scheduling routine screenings is important to maintaining good health. Our records indicate that you may be overdue for your screening pap smear.  Pap smear screening can help identify patients at risk for developing cervical cancer at an early stage, when it is most likely to be successfully treated.    The current recommendation for Pap smear screening is every 3-5 years.  We encourage you to schedule your appointment with your [facility] provider.  Many women see a Gynecologist for this screening but some primary care providers also provide Pap screening.    If you recently had your pap smear screening performed outside of [facility], please let your health care team know so that they can update your health record.     If you have any questions please do not hesitate to call.    Sincerely,    Donald Isaacs MD and your [facility]  Primary Care Team    Dayna KAYE  Clinical Care Coordinator    [facility]  [phone] (Phone)  [phone] (Fax)

## 2023-03-23 NOTE — PROGRESS NOTES
Cervical Cancer Gap Report Review. Care Everywhere updated and reviewed. [facility] and [facility] reviewed. No new HM items found.    Immunizations reviewed.    Letter mailed regarding overdue HM

## 2023-04-28 ENCOUNTER — PATIENT OUTREACH (OUTPATIENT)
Dept: ADMINISTRATIVE | Facility: HOSPITAL | Age: 63
End: 2023-04-28
Payer: MEDICAID

## 2023-04-28 NOTE — LETTER
April 28, 2023    Mily Long  [address] [address]             [facility]  [address]  [place] [address]  Phone: [phone] [facility] is committed to your overall health.  To help you get the most out of each of your visits, we will review your information to make sure you are up to date on all of your recommended tests and/or procedures.       Your PCP  Donald Isaacs MD   found that you may be due for:                                         Mammogram      If you have had any of the above done at another facility, please let us know where you went so that we can request your record. So that your chart with  [facility] will be complete.  If you would like to schedule any of these, please contact us at [phone].      Thank you for choosing [facility] .    Dayna KAYE  Clinical Care Coordinator    [facility]  [phone] (Phone)  [phone] (Fax)

## 2023-04-28 NOTE — PROGRESS NOTES
Breast Cancer Screening Gap Report Review. Care Everywhere updated and reviewed. Media and DIS reviewed. No new HM items found.    Called patient. Left message with son along with contact information to please call back regarding overdue HM    Immunizations queried. Links failed , unable to review at this time.     Letter mailed regarding overdue HM

## 2023-08-08 ENCOUNTER — TELEPHONE (OUTPATIENT)
Dept: FAMILY MEDICINE | Facility: CLINIC | Age: 63
End: 2023-08-08

## 2023-08-08 NOTE — TELEPHONE ENCOUNTER
----- Message from Triny Travis sent at 8/8/2023 11:10 AM CDT -----  Pt of Dr. Isaacs was told to give us a call in 2 weeks to schedule an appt with KHLOE Garber. Please advise  [phone]

## 2023-08-08 NOTE — TELEPHONE ENCOUNTER
Spoke with patient informed medicaid still not accepted at this time, will call once approved.  Patient verbalized understanding -DN

## 2023-08-09 ENCOUNTER — OFFICE VISIT (OUTPATIENT)
Dept: FAMILY MEDICINE | Facility: CLINIC | Age: 63
End: 2023-08-09
Payer: MEDICAID

## 2023-08-09 VITALS
HEIGHT: 63 IN | TEMPERATURE: 99 F | WEIGHT: 152 LBS | BODY MASS INDEX: 26.93 KG/M2 | HEART RATE: 71 BPM | DIASTOLIC BLOOD PRESSURE: 88 MMHG | SYSTOLIC BLOOD PRESSURE: 124 MMHG | OXYGEN SATURATION: 97 %

## 2023-08-09 DIAGNOSIS — Z12.4 CERVICAL CANCER SCREENING: ICD-10-CM

## 2023-08-09 DIAGNOSIS — M51.36 DDD (DEGENERATIVE DISC DISEASE), LUMBAR: ICD-10-CM

## 2023-08-09 DIAGNOSIS — F17.211 CIGARETTE NICOTINE DEPENDENCE IN REMISSION: ICD-10-CM

## 2023-08-09 DIAGNOSIS — M46.92 INFLAMMATORY SPONDYLOPATHY OF CERVICAL REGION: ICD-10-CM

## 2023-08-09 DIAGNOSIS — R06.02 SOB (SHORTNESS OF BREATH): ICD-10-CM

## 2023-08-09 DIAGNOSIS — Z00.00 HEALTHCARE MAINTENANCE: ICD-10-CM

## 2023-08-09 DIAGNOSIS — F41.0 PANIC ANXIETY SYNDROME: Primary | ICD-10-CM

## 2023-08-09 DIAGNOSIS — R79.89 ABNORMAL TSH: ICD-10-CM

## 2023-08-09 DIAGNOSIS — Z87.891 FORMER SMOKER: ICD-10-CM

## 2023-08-09 PROCEDURE — 99214 OFFICE O/P EST MOD 30 MIN: CPT | Performed by: NURSE PRACTITIONER

## 2023-08-09 PROCEDURE — 1160F PR REVIEW ALL MEDS BY PRESCRIBER/CLIN PHARMACIST DOCUMENTED: ICD-10-PCS | Mod: CPTII,,, | Performed by: NURSE PRACTITIONER

## 2023-08-09 PROCEDURE — 3079F PR MOST RECENT DIASTOLIC BLOOD PRESSURE 80-89 MM HG: ICD-10-PCS | Mod: CPTII,,, | Performed by: NURSE PRACTITIONER

## 2023-08-09 PROCEDURE — 3074F PR MOST RECENT SYSTOLIC BLOOD PRESSURE < 130 MM HG: ICD-10-PCS | Mod: CPTII,,, | Performed by: NURSE PRACTITIONER

## 2023-08-09 PROCEDURE — 1160F RVW MEDS BY RX/DR IN RCRD: CPT | Mod: CPTII,,, | Performed by: NURSE PRACTITIONER

## 2023-08-09 PROCEDURE — 3008F PR BODY MASS INDEX (BMI) DOCUMENTED: ICD-10-PCS | Mod: CPTII,,, | Performed by: NURSE PRACTITIONER

## 2023-08-09 PROCEDURE — 3008F BODY MASS INDEX DOCD: CPT | Mod: CPTII,,, | Performed by: NURSE PRACTITIONER

## 2023-08-09 PROCEDURE — 99214 OFFICE O/P EST MOD 30 MIN: CPT | Mod: S$PBB,,, | Performed by: NURSE PRACTITIONER

## 2023-08-09 PROCEDURE — 3079F DIAST BP 80-89 MM HG: CPT | Mod: CPTII,,, | Performed by: NURSE PRACTITIONER

## 2023-08-09 PROCEDURE — 99214 PR OFFICE/OUTPT VISIT, EST, LEVL IV, 30-39 MIN: ICD-10-PCS | Mod: S$PBB,,, | Performed by: NURSE PRACTITIONER

## 2023-08-09 PROCEDURE — 1159F PR MEDICATION LIST DOCUMENTED IN MEDICAL RECORD: ICD-10-PCS | Mod: CPTII,,, | Performed by: NURSE PRACTITIONER

## 2023-08-09 PROCEDURE — 3074F SYST BP LT 130 MM HG: CPT | Mod: CPTII,,, | Performed by: NURSE PRACTITIONER

## 2023-08-09 PROCEDURE — 1159F MED LIST DOCD IN RCRD: CPT | Mod: CPTII,,, | Performed by: NURSE PRACTITIONER

## 2023-08-09 RX ORDER — ALPRAZOLAM 0.5 MG/1
0.5 TABLET ORAL 2 TIMES DAILY PRN
Qty: 60 TABLET | Refills: 0 | Status: SHIPPED | OUTPATIENT
Start: 2023-08-09 | End: 2023-09-13 | Stop reason: SDUPTHER

## 2023-08-09 RX ORDER — IBUPROFEN 800 MG/1
800 TABLET ORAL 3 TIMES DAILY PRN
Qty: 90 TABLET | Refills: 1 | Status: SHIPPED | OUTPATIENT
Start: 2023-08-09 | End: 2024-01-10

## 2023-08-09 RX ORDER — IBUPROFEN 800 MG/1
800 TABLET ORAL EVERY 4 HOURS PRN
COMMUNITY
End: 2023-08-09 | Stop reason: SDUPTHER
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
## 2023-08-11 LAB
ALBUMIN SERPL-MCNC: 4.1 G/DL (ref 3.9–4.9)
ALBUMIN/GLOB SERPL: 1.6 {RATIO} (ref 1.2–2.2)
ALP SERPL-CCNC: 105 IU/L (ref 44–121)
ALT SERPL-CCNC: 15 IU/L (ref 0–32)
APPEARANCE UR: CLEAR
AST SERPL-CCNC: 22 IU/L (ref 0–40)
BASOPHILS # BLD AUTO: 0.1 X10E3/UL (ref 0–0.2)
BASOPHILS NFR BLD AUTO: 1 %
BILIRUB SERPL-MCNC: 0.6 MG/DL (ref 0–1.2)
BILIRUB UR QL STRIP: NEGATIVE
BNP SERPL-MCNC: 180.5 PG/ML (ref 0–100)
BUN SERPL-MCNC: 16 MG/DL (ref 8–27)
BUN/CREAT SERPL: 16 (ref 12–28)
CALCIUM SERPL-MCNC: 9.3 MG/DL (ref 8.7–10.3)
CHLORIDE SERPL-SCNC: 107 MMOL/L (ref 96–106)
CHOLEST SERPL-MCNC: 202 MG/DL (ref 100–199)
CO2 SERPL-SCNC: 22 MMOL/L (ref 20–29)
COLOR UR: YELLOW
CREAT SERPL-MCNC: 0.97 MG/DL (ref 0.57–1)
EOSINOPHIL # BLD AUTO: 0.1 X10E3/UL (ref 0–0.4)
EOSINOPHIL NFR BLD AUTO: 2 %
ERYTHROCYTE [DISTWIDTH] IN BLOOD BY AUTOMATED COUNT: 12.9 % (ref 11.7–15.4)
EST. GFR  (NO RACE VARIABLE): 66 ML/MIN/1.73
GLOBULIN SER CALC-MCNC: 2.6 G/DL (ref 1.5–4.5)
GLUCOSE SERPL-MCNC: 93 MG/DL (ref 70–99)
GLUCOSE UR QL STRIP: NEGATIVE
HBA1C MFR BLD: 5.6 % (ref 4.8–5.6)
HCT VFR BLD AUTO: 39.9 % (ref 34–46.6)
HDLC SERPL-MCNC: 54 MG/DL
HGB BLD-MCNC: 12.9 G/DL (ref 11.1–15.9)
HGB UR QL STRIP: NEGATIVE
IMM GRANULOCYTES # BLD AUTO: 0 X10E3/UL (ref 0–0.1)
IMM GRANULOCYTES NFR BLD AUTO: 0 %
KETONES UR QL STRIP: NEGATIVE
LDLC SERPL CALC-MCNC: 139 MG/DL (ref 0–99)
LEUKOCYTE ESTERASE UR QL STRIP: NEGATIVE
LYMPHOCYTES # BLD AUTO: 1.8 X10E3/UL (ref 0.7–3.1)
LYMPHOCYTES NFR BLD AUTO: 41 %
MCH RBC QN AUTO: 27.9 PG (ref 26.6–33)
MCHC RBC AUTO-ENTMCNC: 32.3 G/DL (ref 31.5–35.7)
MCV RBC AUTO: 86 FL (ref 79–97)
MICRO URNS: NORMAL
MONOCYTES # BLD AUTO: 0.5 X10E3/UL (ref 0.1–0.9)
MONOCYTES NFR BLD AUTO: 12 %
NEUTROPHILS # BLD AUTO: 1.9 X10E3/UL (ref 1.4–7)
NEUTROPHILS NFR BLD AUTO: 44 %
NITRITE UR QL STRIP: NEGATIVE
PH UR STRIP: 6 [PH] (ref 5–7.5)
PLATELET # BLD AUTO: 283 X10E3/UL (ref 150–450)
POTASSIUM SERPL-SCNC: 4.7 MMOL/L (ref 3.5–5.2)
PROT SERPL-MCNC: 6.7 G/DL (ref 6–8.5)
PROT UR QL STRIP: NEGATIVE
RBC # BLD AUTO: 4.63 X10E6/UL (ref 3.77–5.28)
SODIUM SERPL-SCNC: 143 MMOL/L (ref 134–144)
SP GR UR STRIP: 1.01 (ref 1–1.03)
T4 FREE SERPL-MCNC: 1.2 NG/DL (ref 0.82–1.77)
TRIGL SERPL-MCNC: 52 MG/DL (ref 0–149)
TSH SERPL DL<=0.005 MIU/L-ACNC: 4.52 UIU/ML (ref 0.45–4.5)
UROBILINOGEN UR STRIP-MCNC: 0.2 MG/DL (ref 0.2–1)
VLDLC SERPL CALC-MCNC: 9 MG/DL (ref 5–40)
WBC # BLD AUTO: 4.4 X10E3/UL (ref 3.4–10.8)

## 2023-08-14 ENCOUNTER — TELEPHONE (OUTPATIENT)
Dept: FAMILY MEDICINE | Facility: CLINIC | Age: 63
End: 2023-08-14

## 2023-08-14 DIAGNOSIS — R79.89 ELEVATED BRAIN NATRIURETIC PEPTIDE (BNP) LEVEL: ICD-10-CM

## 2023-08-14 DIAGNOSIS — R06.02 SOB (SHORTNESS OF BREATH): Primary | ICD-10-CM

## 2023-08-14 NOTE — PROGRESS NOTES
Please call patient.  TSH remains slightly elevated, if she is having symptoms of hypothyroidism, can try low dose levothyroxine.  BNP was also slightly elevated at 180.5, will order an echo for further eval, SOB may be cardiac related.  Remaining labs are stable.

## 2023-08-17 ENCOUNTER — TELEPHONE (OUTPATIENT)
Dept: FAMILY MEDICINE | Facility: CLINIC | Age: 63
End: 2023-08-17

## 2023-08-17 NOTE — TELEPHONE ENCOUNTER
----- Message from Terrell Newton NP sent at 8/14/2023 11:52 AM CDT -----  Please call patient.  TSH remains slightly elevated, if she is having symptoms of hypothyroidism, can try low dose levothyroxine.  BNP was also slightly elevated at 180.5, will order an echo for further eval, SOB may be cardiac related.  Remaining labs are stable.

## 2023-09-11 ENCOUNTER — TELEPHONE (OUTPATIENT)
Dept: FAMILY MEDICINE | Facility: CLINIC | Age: 63
End: 2023-09-11

## 2023-09-11 NOTE — TELEPHONE ENCOUNTER
----- Message from Ramona Shaw sent at 9/11/2023 10:04 AM CDT -----  Regarding: refill  Contact: patient  Type:  RX Refill Request    Who Called:  patient  Refill or New Rx:  refill  RX Name and Strength:  ALPRAZolam (XANAX) 0.5 MG tablet      How is the patient currently taking it? (ex. 1XDay):  as directed  Is this a 30 day or 90 day RX:  30  Preferred Pharmacy with phone number:    [id number] [address] & [address] [address]  Phone: [phone] Fax: [phone]      Local or Mail Order:  local  Ordering Provider:  BRITTANIE Newton NP  Best Call Back Number:  [phone] (home)     Additional Information:  Please call patient to advise.  Thanks!

## 2023-09-13 DIAGNOSIS — F41.0 PANIC ANXIETY SYNDROME: ICD-10-CM

## 2023-09-13 RX ORDER — ALPRAZOLAM 0.5 MG/1
0.5 TABLET ORAL 2 TIMES DAILY PRN
Qty: 60 TABLET | Refills: 0 | Status: SHIPPED | OUTPATIENT
Start: 2023-09-13 | End: 2023-10-16 | Stop reason: SDUPTHER

## 2023-09-14 ENCOUNTER — TELEPHONE (OUTPATIENT)
Dept: FAMILY MEDICINE | Facility: CLINIC | Age: 63
End: 2023-09-14

## 2023-09-14 NOTE — TELEPHONE ENCOUNTER
----- Message from Ebony Gee sent at 9/13/2023  8:39 AM CDT -----  Contact: Self  Type:  RX Refill Request    Who Called:  Patient  Refill or New Rx: New Rx  RX Name and Strength:   ALPRAZolam (XANAX) 0.5 MG tablet  How is the patient currently taking it? (ex. 1XDay):  As Directed  Is this a 30 day or 90 day RX:  90  Preferred Pharmacy with phone number:    [facility] #[id number] [address] & [address] [address]  Phone: [phone] Fax: [phone]  Local or Mail Order:  Local  Ordering Provider:  Dr Ferny Bowens Call Back Number:  [phone]  Additional Information:  Pt is needing this rx refilled stated she usually has them sent in advance to the pharmacy, can we please get these sent asap, stated she hasn't been sleeping because of being out. Thank You.

## 2023-10-16 ENCOUNTER — TELEPHONE (OUTPATIENT)
Dept: FAMILY MEDICINE | Facility: CLINIC | Age: 63
End: 2023-10-16

## 2023-10-16 DIAGNOSIS — F41.0 PANIC ANXIETY SYNDROME: ICD-10-CM

## 2023-10-16 RX ORDER — ALPRAZOLAM 0.5 MG/1
0.5 TABLET ORAL 2 TIMES DAILY PRN
Qty: 60 TABLET | Refills: 0 | Status: SHIPPED | OUTPATIENT
Start: 2023-10-16 | End: 2023-11-13 | Stop reason: SDUPTHER

## 2023-10-16 NOTE — TELEPHONE ENCOUNTER
Last Office Visit 8-9-23  Next Office Visit 11-9-23        ----- Message from Liseth Giron sent at 10/16/2023  8:47 AM CDT -----  Regarding: Med Refill  Patient is requesting a refill on ALPRAZolam (XANAX) 0.5 MG tablet. Please advise. Thank you!

## 2023-11-09 ENCOUNTER — OFFICE VISIT (OUTPATIENT)
Dept: FAMILY MEDICINE | Facility: CLINIC | Age: 63
End: 2023-11-09
Payer: MEDICAID

## 2023-11-09 VITALS
BODY MASS INDEX: 25.52 KG/M2 | HEIGHT: 63 IN | WEIGHT: 144 LBS | OXYGEN SATURATION: 98 % | SYSTOLIC BLOOD PRESSURE: 144 MMHG | HEART RATE: 61 BPM | TEMPERATURE: 98 F | DIASTOLIC BLOOD PRESSURE: 80 MMHG

## 2023-11-09 DIAGNOSIS — R03.0 ELEVATED BP WITHOUT DIAGNOSIS OF HYPERTENSION: ICD-10-CM

## 2023-11-09 DIAGNOSIS — R51.9 NEW ONSET OF HEADACHES: ICD-10-CM

## 2023-11-09 DIAGNOSIS — S09.93XA FACIAL INJURY, INITIAL ENCOUNTER: ICD-10-CM

## 2023-11-09 DIAGNOSIS — F41.0 PANIC ANXIETY SYNDROME: Primary | ICD-10-CM

## 2023-11-09 DIAGNOSIS — Z12.4 CERVICAL CANCER SCREENING: ICD-10-CM

## 2023-11-09 DIAGNOSIS — R63.4 UNINTENTIONAL WEIGHT LOSS: ICD-10-CM

## 2023-11-09 DIAGNOSIS — R79.89 ABNORMAL TSH: ICD-10-CM

## 2023-11-09 PROCEDURE — 3079F DIAST BP 80-89 MM HG: CPT | Mod: CPTII,,, | Performed by: NURSE PRACTITIONER

## 2023-11-09 PROCEDURE — 3079F PR MOST RECENT DIASTOLIC BLOOD PRESSURE 80-89 MM HG: ICD-10-PCS | Mod: CPTII,,, | Performed by: NURSE PRACTITIONER

## 2023-11-09 PROCEDURE — 99214 OFFICE O/P EST MOD 30 MIN: CPT | Performed by: NURSE PRACTITIONER

## 2023-11-09 PROCEDURE — 99215 OFFICE O/P EST HI 40 MIN: CPT | Mod: S$PBB,,, | Performed by: NURSE PRACTITIONER

## 2023-11-09 PROCEDURE — 1159F MED LIST DOCD IN RCRD: CPT | Mod: CPTII,,, | Performed by: NURSE PRACTITIONER

## 2023-11-09 PROCEDURE — 1159F PR MEDICATION LIST DOCUMENTED IN MEDICAL RECORD: ICD-10-PCS | Mod: CPTII,,, | Performed by: NURSE PRACTITIONER

## 2023-11-09 PROCEDURE — 3077F SYST BP >= 140 MM HG: CPT | Mod: CPTII,,, | Performed by: NURSE PRACTITIONER

## 2023-11-09 PROCEDURE — 99215 PR OFFICE/OUTPT VISIT, EST, LEVL V, 40-54 MIN: ICD-10-PCS | Mod: S$PBB,,, | Performed by: NURSE PRACTITIONER

## 2023-11-09 PROCEDURE — 3044F PR MOST RECENT HEMOGLOBIN A1C LEVEL <7.0%: ICD-10-PCS | Mod: CPTII,,, | Performed by: NURSE PRACTITIONER

## 2023-11-09 PROCEDURE — 3008F BODY MASS INDEX DOCD: CPT | Mod: CPTII,,, | Performed by: NURSE PRACTITIONER

## 2023-11-09 PROCEDURE — 3044F HG A1C LEVEL LT 7.0%: CPT | Mod: CPTII,,, | Performed by: NURSE PRACTITIONER

## 2023-11-09 PROCEDURE — 1160F RVW MEDS BY RX/DR IN RCRD: CPT | Mod: CPTII,,, | Performed by: NURSE PRACTITIONER

## 2023-11-09 PROCEDURE — 1160F PR REVIEW ALL MEDS BY PRESCRIBER/CLIN PHARMACIST DOCUMENTED: ICD-10-PCS | Mod: CPTII,,, | Performed by: NURSE PRACTITIONER

## 2023-11-09 PROCEDURE — 3077F PR MOST RECENT SYSTOLIC BLOOD PRESSURE >= 140 MM HG: ICD-10-PCS | Mod: CPTII,,, | Performed by: NURSE PRACTITIONER

## 2023-11-09 PROCEDURE — 3008F PR BODY MASS INDEX (BMI) DOCUMENTED: ICD-10-PCS | Mod: CPTII,,, | Performed by: NURSE PRACTITIONER

## 2023-11-09 NOTE — PROGRESS NOTES
SUBJECTIVE:      Patient ID: Mily Long is a 63 y.o. female.    Chief Complaint: Anxiety    63-year-old female presents to the clinic for anxiety follow-up.      She had a trip and fall on Oct 14th.  She landed on the left side of her face.  Denies LOC.  Reports swelling and hematoma to left eye. Denies specific vision changes or eye pain with EOMs. She does report intermittent headaches since the fall. She has been treating symptoms with ice packs and ibuprofen which helps. She still feels like she has some fluid around the left eye. Denies neurological deficits.     Anxiety is stable with Xanax 0.5 mg bid prn.  The Xanax also helps her sleep at night.  Mood is stable. She has not required a dosage adjustment in several years.    Blood pressure is elevated today.  She does not have a hx of hypertension at this time.  She does have chronic neck and back pain and uses ibuprofen prn pain.  She also reports headaches, but associated them with the fall last month.     Reports losing 10-11 lb in the past month, which has been unintentional. TSH has been abnormal so will recheck. Denies increased anxiety. No changes in diet or new medications. She has not been exercising. No showed GYN appointment, letter in media. Did not schedule appointment for Low dose CT lung screening.  Never scheduled echo.            Family History   Problem Relation Age of Onset    Diabetes Father       Social History     Socioeconomic History    Marital status: Single   Tobacco Use    Smoking status: Former     Current packs/day: 0.00     Average packs/day: 1 pack/day for 35.0 years (35.0 ttl pk-yrs)     Types: Cigarettes     Start date:      Quit date: 2017     Years since quittin.8    Smokeless tobacco: Never   Substance and Sexual Activity    Alcohol use: Not Currently    Drug use: Never     Current Outpatient Medications   Medication Sig Dispense Refill    ALPRAZolam (XANAX) 0.5 MG tablet Take 1 tablet (0.5 mg total) by mouth 2  (two) times daily as needed for Anxiety. 60 tablet 0    ibuprofen (ADVIL,MOTRIN) 800 MG tablet Take 1 tablet (800 mg total) by mouth 3 (three) times daily as needed for Pain. 90 tablet 1     No current facility-administered medications for this visit.     Review of patient's allergies indicates:   Allergen Reactions    Codeine     Hydrocodone       Past Medical History:   Diagnosis Date    DDD (degenerative disc disease), cervical     DDD (degenerative disc disease), lumbar     Insomnia     Panic anxiety syndrome     Spondyloarthritis      Past Surgical History:   Procedure Laterality Date     SECTION         Review of Systems   Constitutional:  Positive for unexpected weight change. Negative for activity change, chills, fatigue and fever.   HENT:  Negative for congestion, ear pain, sinus pressure, sore throat, trouble swallowing and voice change.    Eyes:  Negative for pain, discharge and visual disturbance.   Respiratory:  Negative for cough, chest tightness, shortness of breath and wheezing.         Former smoker   Cardiovascular:  Negative for chest pain and palpitations.        Elevated BP   Gastrointestinal:  Negative for abdominal pain, constipation, diarrhea, nausea and vomiting.   Genitourinary:  Negative for difficulty urinating, flank pain, frequency and urgency.   Musculoskeletal:  Negative for back pain and joint swelling.   Skin:  Negative for color change and rash.   Neurological:  Positive for headaches. Negative for dizziness, seizures, syncope, weakness and numbness.        Left facial swelling s/p fall   Hematological:  Negative for adenopathy.   Psychiatric/Behavioral:  Negative for dysphoric mood and sleep disturbance. The patient is nervous/anxious.       OBJECTIVE:      Vitals:    23 1331 23 1355   BP: (!) 148/92 (!) 144/80   BP Location: Right arm    Patient Position: Sitting    BP Method: Medium (Manual)    Pulse: 61    Temp: 98.3 °F (36.8 °C)    TempSrc: Oral    SpO2:  "98%    Weight: 65.3 kg (144 lb)    Height: 5' 3" (1.6 m)      Physical Exam  Vitals and nursing note reviewed.   Constitutional:       General: She is awake. She is not in acute distress.     Appearance: Normal appearance. She is overweight. She is not ill-appearing, toxic-appearing or diaphoretic.   HENT:      Head: Normocephalic and atraumatic.      Comments: Faint edema around eye, no significant bruising today.       Right Ear: Tympanic membrane, ear canal and external ear normal.      Left Ear: Tympanic membrane, ear canal and external ear normal.      Nose: Nose normal.      Mouth/Throat:      Lips: Pink.      Mouth: Mucous membranes are moist.      Dentition: No dental tenderness.      Tongue: Tongue does not deviate from midline.      Pharynx: Oropharynx is clear. Uvula midline. No pharyngeal swelling, oropharyngeal exudate, posterior oropharyngeal erythema or uvula swelling.   Eyes:      General: Lids are normal. Gaze aligned appropriately.      Conjunctiva/sclera: Conjunctivae normal.      Right eye: Right conjunctiva is not injected.      Left eye: Left conjunctiva is not injected.      Pupils: Pupils are equal, round, and reactive to light.   Neck:      Thyroid: No thyromegaly or thyroid tenderness.   Cardiovascular:      Rate and Rhythm: Normal rate and regular rhythm.      Pulses: Normal pulses.      Heart sounds: Normal heart sounds, S1 normal and S2 normal. No murmur heard.     No friction rub. No gallop.   Pulmonary:      Effort: Pulmonary effort is normal. No respiratory distress.      Breath sounds: Normal breath sounds. No stridor. No decreased breath sounds, wheezing, rhonchi or rales.   Chest:      Chest wall: No tenderness.   Musculoskeletal:      Cervical back: Neck supple.      Right lower leg: No edema.      Left lower leg: No edema.   Lymphadenopathy:      Cervical: No cervical adenopathy.   Skin:     General: Skin is warm and dry.      Capillary Refill: Capillary refill takes less than 2 " seconds.      Findings: No erythema or rash.   Neurological:      Mental Status: She is alert and oriented to person, place, and time. Mental status is at baseline.   Psychiatric:         Attention and Perception: Attention normal.         Mood and Affect: Mood normal.         Speech: Speech normal.         Behavior: Behavior normal. Behavior is cooperative.         Thought Content: Thought content normal.         Judgment: Judgment normal.        No visits with results within 3 Month(s) from this visit.   Latest known visit with results is:   Office Visit on 08/09/2023   Component Date Value Ref Range Status    Glucose 08/10/2023 93  70 - 99 mg/dL Final    BUN 08/10/2023 16  8 - 27 mg/dL Final    Creatinine 08/10/2023 0.97  0.57 - 1.00 mg/dL Final    eGFR 08/10/2023 66  >59 mL/min/1.73 Final    BUN/Creatinine Ratio 08/10/2023 16  12 - 28 Final    Sodium 08/10/2023 143  134 - 144 mmol/L Final    Potassium 08/10/2023 4.7  3.5 - 5.2 mmol/L Final    Chloride 08/10/2023 107 (H)  96 - 106 mmol/L Final    CO2 08/10/2023 22  20 - 29 mmol/L Final    Calcium 08/10/2023 9.3  8.7 - 10.3 mg/dL Final    Protein, Total 08/10/2023 6.7  6.0 - 8.5 g/dL Final    Albumin 08/10/2023 4.1  3.9 - 4.9 g/dL Final    Globulin, Total 08/10/2023 2.6  1.5 - 4.5 g/dL Final    Albumin/Globulin Ratio 08/10/2023 1.6  1.2 - 2.2 Final    Total Bilirubin 08/10/2023 0.6  0.0 - 1.2 mg/dL Final    Alkaline Phosphatase 08/10/2023 105  44 - 121 IU/L Final    AST 08/10/2023 22  0 - 40 IU/L Final    ALT 08/10/2023 15  0 - 32 IU/L Final    Cholesterol 08/10/2023 202 (H)  100 - 199 mg/dL Final    Triglycerides 08/10/2023 52  0 - 149 mg/dL Final    HDL 08/10/2023 54  >39 mg/dL Final    VLDL Cholesterol Bobby 08/10/2023 9  5 - 40 mg/dL Final    LDL Calculated 08/10/2023 139 (H)  0 - 99 mg/dL Final    TSH 08/10/2023 4.520 (H)  0.450 - 4.500 uIU/mL Final    T4, Free 08/10/2023 1.20  0.82 - 1.77 ng/dL Final    WBC 08/10/2023 4.4  3.4 - 10.8 x10E3/uL Final    RBC  08/10/2023 4.63  3.77 - 5.28 x10E6/uL Final    Hemoglobin 08/10/2023 12.9  11.1 - 15.9 g/dL Final    Hematocrit 08/10/2023 39.9  34.0 - 46.6 % Final    MCV 08/10/2023 86  79 - 97 fL Final    MCH 08/10/2023 27.9  26.6 - 33.0 pg Final    MCHC 08/10/2023 32.3  31.5 - 35.7 g/dL Final    RDW 08/10/2023 12.9  11.7 - 15.4 % Final    Platelets 08/10/2023 283  150 - 450 x10E3/uL Final    Neutrophils 08/10/2023 44  Not Estab. % Final    Lymphs 08/10/2023 41  Not Estab. % Final    Monocytes 08/10/2023 12  Not Estab. % Final    Eos 08/10/2023 2  Not Estab. % Final    Basos 08/10/2023 1  Not Estab. % Final    Neutrophils (Absolute) 08/10/2023 1.9  1.4 - 7.0 x10E3/uL Final    Lymphs (Absolute) 08/10/2023 1.8  0.7 - 3.1 x10E3/uL Final    Monocytes(Absolute) 08/10/2023 0.5  0.1 - 0.9 x10E3/uL Final    Eos (Absolute) 08/10/2023 0.1  0.0 - 0.4 x10E3/uL Final    Baso (Absolute) 08/10/2023 0.1  0.0 - 0.2 x10E3/uL Final    Immature Granulocytes 08/10/2023 0  Not Estab. % Final    Immature Grans (Abs) 08/10/2023 0.0  0.0 - 0.1 x10E3/uL Final    Hemoglobin A1c 08/10/2023 5.6  4.8 - 5.6 % Final    Comment:          Prediabetes: 5.7 - 6.4           Diabetes: >6.4           Glycemic control for adults with diabetes: <7.0      Specific Gravity, UA 08/10/2023 1.015  1.005 - 1.030 Final    pH, UA 08/10/2023 6.0  5.0 - 7.5 Final    Color, UA 08/10/2023 Yellow  Yellow Final    Clarity, UA 08/10/2023 Clear  Clear Final    Leukocytes, UA 08/10/2023 Negative  Negative Final    Protein, UA 08/10/2023 Negative  Negative/Trace Final    Glucose, UA 08/10/2023 Negative  Negative Final    Ketones, UA 08/10/2023 Negative  Negative Final    Occult Blood UA 08/10/2023 Negative  Negative Final    Bilirubin, UA 08/10/2023 Negative  Negative Final    Urobilinogen, UA 08/10/2023 0.2  0.2 - 1.0 mg/dL Final    Nitrite, UA 08/10/2023 Negative  Negative Final    Microscopic Examination 08/10/2023 Comment   Final    Microscopic not indicated and not performed.    BNP  08/10/2023 180.5 (H)  0.0 - 100.0 pg/mL Final    [employer]IA [employer] XP methodology     Assessment:       1. Panic anxiety syndrome    2. Facial injury, initial encounter    3. New onset of headaches    4. Unintentional weight loss    5. Abnormal TSH    6. Cervical cancer screening        Plan:       Panic anxiety syndrome  Stable, continue Xanax.     Facial injury, initial encounter  Left eye is healing.  Swelling has almost resolved. Continue to ice area prn.     New onset of headaches  Unsure if it was related to the fall or due to hypertension.  Will recheck BP in 1 week. No acute neurological deficits, could be post concussion headaches. If she continues to have headaches will discuss imaging. Recommend tylenol for headaches given the possible new onset hypertension, want to avoid NSAIDs.     Elevated BP without diagnosis of hypertension  BP elevated on 2 readings. Will have patient follow-up in 1 week for repeat BP in office. Recommend low sodium diet. Limit NSAID use.    Unintentional weight loss  Unsure the cause.  She needs to completed multiple cancer screenings. Will recheck TSH since they have been abnormal in the past.   -     Comprehensive Metabolic Panel; Future; Expected date: 11/09/2023  -     TSH; Future; Expected date: 11/09/2023  -     T4, Free; Future; Expected date: 11/09/2023    Abnormal TSH  -     Comprehensive Metabolic Panel; Future; Expected date: 11/09/2023  -     TSH; Future; Expected date: 11/09/2023  -     T4, Free; Future; Expected date: 11/09/2023    Cervical cancer screening  -     Ambulatory referral/consult to Obstetrics / Gynecology; Future; Expected date: 11/16/2023    This note was created using [employer] voice recognition software that occasionally misinterprets phrases or words.     I spent a total of 41 minutes on the day of the visit.This includes face to face time and non-face to face time preparing to see the patient (eg, review of tests), obtaining and/or reviewing  separately obtained history, documenting clinical information in the electronic or other health record, independently interpreting results and communicating results to the patient/family/caregiver, or care coordinator.    Follow up in about 1 week (around 11/16/2023) for BP Check-Up w/Nurse.      11/9/2023 FABRICE Dawkins, CLEMENTEP

## 2023-11-10 DIAGNOSIS — F41.0 PANIC ANXIETY SYNDROME: ICD-10-CM

## 2023-11-10 RX ORDER — ALPRAZOLAM 0.5 MG/1
0.5 TABLET ORAL 2 TIMES DAILY PRN
Qty: 60 TABLET | Refills: 0 | Status: CANCELLED | OUTPATIENT
Start: 2023-11-10

## 2023-11-13 ENCOUNTER — TELEPHONE (OUTPATIENT)
Dept: FAMILY MEDICINE | Facility: CLINIC | Age: 63
End: 2023-11-13

## 2023-11-13 DIAGNOSIS — F41.0 PANIC ANXIETY SYNDROME: ICD-10-CM

## 2023-11-13 RX ORDER — ALPRAZOLAM 0.5 MG/1
0.5 TABLET ORAL 2 TIMES DAILY PRN
Qty: 60 TABLET | Refills: 0 | Status: SHIPPED | OUTPATIENT
Start: 2023-11-13 | End: 2023-12-13 | Stop reason: SDUPTHER

## 2023-11-16 ENCOUNTER — CLINICAL SUPPORT (OUTPATIENT)
Dept: FAMILY MEDICINE | Facility: CLINIC | Age: 63
End: 2023-11-16
Payer: MEDICAID

## 2023-11-16 VITALS — DIASTOLIC BLOOD PRESSURE: 80 MMHG | SYSTOLIC BLOOD PRESSURE: 138 MMHG

## 2023-11-16 DIAGNOSIS — R03.0 ELEVATED BP WITHOUT DIAGNOSIS OF HYPERTENSION: Primary | ICD-10-CM

## 2023-11-16 NOTE — PROGRESS NOTES
Patient presents to clinic for nurse visit blood pressure check on right arm 144/80 1st check and 138/80 was the recheck.

## 2023-12-13 DIAGNOSIS — F41.0 PANIC ANXIETY SYNDROME: ICD-10-CM

## 2023-12-13 RX ORDER — ALPRAZOLAM 0.5 MG/1
0.5 TABLET ORAL 2 TIMES DAILY PRN
Qty: 60 TABLET | Refills: 0 | Status: SHIPPED | OUTPATIENT
Start: 2023-12-13 | End: 2024-01-10 | Stop reason: SDUPTHER

## 2024-01-10 ENCOUNTER — OFFICE VISIT (OUTPATIENT)
Dept: FAMILY MEDICINE | Facility: CLINIC | Age: 64
End: 2024-01-10
Attending: FAMILY MEDICINE
Payer: MEDICAID

## 2024-01-10 VITALS
BODY MASS INDEX: 25.87 KG/M2 | WEIGHT: 146 LBS | OXYGEN SATURATION: 99 % | SYSTOLIC BLOOD PRESSURE: 112 MMHG | DIASTOLIC BLOOD PRESSURE: 62 MMHG | HEIGHT: 63 IN | HEART RATE: 80 BPM

## 2024-01-10 DIAGNOSIS — F41.0 PANIC ANXIETY SYNDROME: ICD-10-CM

## 2024-01-10 DIAGNOSIS — R79.89 ELEVATED BRAIN NATRIURETIC PEPTIDE (BNP) LEVEL: ICD-10-CM

## 2024-01-10 DIAGNOSIS — Z51.81 ENCOUNTER FOR THERAPEUTIC DRUG MONITORING: ICD-10-CM

## 2024-01-10 DIAGNOSIS — R79.89 ABNORMAL TSH: ICD-10-CM

## 2024-01-10 DIAGNOSIS — Z00.00 ANNUAL PHYSICAL EXAM: Primary | ICD-10-CM

## 2024-01-10 DIAGNOSIS — Z87.891 FORMER SMOKER: ICD-10-CM

## 2024-01-10 DIAGNOSIS — R06.09 DOE (DYSPNEA ON EXERTION): ICD-10-CM

## 2024-01-10 DIAGNOSIS — Z79.899 ENCOUNTER FOR LONG-TERM CURRENT USE OF HIGH RISK MEDICATION: ICD-10-CM

## 2024-01-10 DIAGNOSIS — M51.36 DDD (DEGENERATIVE DISC DISEASE), LUMBAR: ICD-10-CM

## 2024-01-10 PROCEDURE — 3078F DIAST BP <80 MM HG: CPT | Mod: CPTII,S$GLB,, | Performed by: FAMILY MEDICINE

## 2024-01-10 PROCEDURE — 3008F BODY MASS INDEX DOCD: CPT | Mod: CPTII,S$GLB,, | Performed by: FAMILY MEDICINE

## 2024-01-10 PROCEDURE — 99396 PREV VISIT EST AGE 40-64: CPT | Mod: S$GLB,,, | Performed by: FAMILY MEDICINE

## 2024-01-10 PROCEDURE — 1159F MED LIST DOCD IN RCRD: CPT | Mod: CPTII,S$GLB,, | Performed by: FAMILY MEDICINE

## 2024-01-10 PROCEDURE — 3074F SYST BP LT 130 MM HG: CPT | Mod: CPTII,S$GLB,, | Performed by: FAMILY MEDICINE

## 2024-01-10 RX ORDER — ALPRAZOLAM 0.5 MG/1
0.5 TABLET ORAL 2 TIMES DAILY PRN
Qty: 60 TABLET | Refills: 5 | Status: SHIPPED | OUTPATIENT
Start: 2024-01-10

## 2024-01-10 RX ORDER — ACETAMINOPHEN 500 MG
500 TABLET ORAL EVERY 8 HOURS PRN
COMMUNITY

## 2024-01-10 RX ORDER — CALCIUM CARBONATE 500(1250)
1 TABLET ORAL DAILY
COMMUNITY

## 2024-01-10 NOTE — PROGRESS NOTES
SUBJECTIVE:   HPI: Mily Long  is a 63 y.o. female who presents for annual physical .   Annual Exam, requesting routine lab orders (-c/o urinary frequency), check eye due to fall, and declined flu vaccine    Patient presents for her annual examination.  She declines health maintenance of mammogram and cervical cancer screening.  She has some recent issues with some abnormal labs.  TSH was slightly elevated.  A BNP was performed by another provider secondary to patient's complaints of shortness a breath.  This was noted to be elevated.  Echo, lung CT and pulmonary function tests were ordered in his former smoker.  Tests have not been completed at this time.    Patient had a trip and fall that resulted in a frontal headache.  Was given ibuprofen to help with neck pain and headache.  This helped with symptoms but it notably increased her blood pressure.  She discontinued the medicine and started on vitamins supplements.  Blood pressures have returned to normal limits.    Exercises regularly.       (Not in a hospital admission)    Review of patient's allergies indicates:   Allergen Reactions    Codeine     Hydrocodone      Current Outpatient Medications on File Prior to Visit   Medication Sig Dispense Refill    acetaminophen (TYLENOL) 500 MG tablet Take 500 mg by mouth every 8 (eight) hours as needed for Pain.      ascorbic acid/collagen hydr (COLLAGEN SKIN RENEWAL ORAL) Take by mouth Daily.      Bacillus coagulans (PROBIOTIC, B. COAGULANS, ORAL) Take by mouth Daily.      calcium carbonate (OS-JOSH) 500 mg calcium (1,250 mg) tablet Take 1 tablet by mouth once daily.      POTASSIUM ORAL Take by mouth Daily.      [DISCONTINUED] ALPRAZolam (XANAX) 0.5 MG tablet Take 1 tablet (0.5 mg total) by mouth 2 (two) times daily as needed for Anxiety. 60 tablet 0    [DISCONTINUED] ibuprofen (ADVIL,MOTRIN) 800 MG tablet Take 1 tablet (800 mg total) by mouth 3 (three) times daily as needed for Pain. (Patient not taking: Reported  on 1/10/2024) 90 tablet 1     No current facility-administered medications on file prior to visit.     Past Medical History:   Diagnosis Date    DDD (degenerative disc disease), cervical     DDD (degenerative disc disease), lumbar     Insomnia     Panic anxiety syndrome     Spondyloarthritis      Past Surgical History:   Procedure Laterality Date     SECTION       Family History   Problem Relation Age of Onset    Diabetes Father      Social History     Tobacco Use    Smoking status: Former     Current packs/day: 0.00     Average packs/day: 1 pack/day for 35.0 years (35.0 ttl pk-yrs)     Types: Cigarettes     Start date:      Quit date:      Years since quittin.0    Smokeless tobacco: Never   Substance Use Topics    Alcohol use: Not Currently    Drug use: Never      Health Maintenance Topics with due status: Not Due       Topic Last Completion Date    Colorectal Cancer Screening 2022    Hemoglobin A1c (Diabetic Prevention Screening) 08/10/2023    Lipid Panel 08/10/2023       There is no immunization history on file for this patient.    Review of Systems   Constitutional:  Negative for activity change, fatigue and unexpected weight change.   HENT:  Negative for hearing loss, postnasal drip, sinus pressure, sore throat and voice change.    Eyes:  Negative for photophobia and visual disturbance.   Respiratory:  Negative for cough, shortness of breath and wheezing.    Cardiovascular:  Negative for chest pain and palpitations.   Gastrointestinal:  Negative for constipation, diarrhea and nausea.   Genitourinary:  Negative for difficulty urinating, frequency, hematuria and urgency.   Musculoskeletal:  Negative for arthralgias and back pain.   Skin:  Negative for rash.   Neurological:  Negative for weakness, light-headedness and headaches.   Hematological:  Negative for adenopathy. Does not bruise/bleed easily.   Psychiatric/Behavioral:  The patient is not nervous/anxious.       OBJECTIVE:     "  Vitals:    01/10/24 1355   BP: 112/62   Pulse: 80   SpO2: 99%   Weight: 66.2 kg (146 lb)   Height: 5' 3" (1.6 m)     Physical Exam  Vitals reviewed.   Constitutional:       General: She is not in acute distress.     Appearance: Normal appearance. She is well-developed.   HENT:      Head: Normocephalic and atraumatic.      Right Ear: External ear normal.      Left Ear: External ear normal.      Nose: Nose normal.      Mouth/Throat:      Mouth: Mucous membranes are moist.   Eyes:      General: Lids are normal.      Conjunctiva/sclera: Conjunctivae normal.      Pupils: Pupils are equal, round, and reactive to light.   Neck:      Thyroid: No thyromegaly.      Vascular: No JVD.      Trachea: No tracheal deviation.   Cardiovascular:      Rate and Rhythm: Normal rate and regular rhythm.      Chest Wall: PMI is not displaced.      Pulses: Normal pulses.      Heart sounds: Normal heart sounds.   Pulmonary:      Effort: Pulmonary effort is normal.      Breath sounds: Normal breath sounds.   Abdominal:      General: Bowel sounds are normal.      Palpations: Abdomen is soft.      Tenderness: There is no abdominal tenderness. There is no guarding or rebound.   Musculoskeletal:         General: No tenderness. Normal range of motion.      Cervical back: Full passive range of motion without pain and neck supple.   Skin:     General: Skin is warm and dry.      Findings: No rash.   Neurological:      General: No focal deficit present.      Mental Status: She is alert and oriented to person, place, and time.   Psychiatric:         Mood and Affect: Mood normal.         Behavior: Behavior normal.            Office Visit on 08/09/2023   Component Date Value Ref Range Status    Glucose 08/10/2023 93  70 - 99 mg/dL Final    BUN 08/10/2023 16  8 - 27 mg/dL Final    Creatinine 08/10/2023 0.97  0.57 - 1.00 mg/dL Final    eGFR 08/10/2023 66  >59 mL/min/1.73 Final    BUN/Creatinine Ratio 08/10/2023 16  12 - 28 Final    Sodium 08/10/2023 143  " 134 - 144 mmol/L Final    Potassium 08/10/2023 4.7  3.5 - 5.2 mmol/L Final    Chloride 08/10/2023 107 (H)  96 - 106 mmol/L Final    CO2 08/10/2023 22  20 - 29 mmol/L Final    Calcium 08/10/2023 9.3  8.7 - 10.3 mg/dL Final    Protein, Total 08/10/2023 6.7  6.0 - 8.5 g/dL Final    Albumin 08/10/2023 4.1  3.9 - 4.9 g/dL Final    Globulin, Total 08/10/2023 2.6  1.5 - 4.5 g/dL Final    Albumin/Globulin Ratio 08/10/2023 1.6  1.2 - 2.2 Final    Total Bilirubin 08/10/2023 0.6  0.0 - 1.2 mg/dL Final    Alkaline Phosphatase 08/10/2023 105  44 - 121 IU/L Final    AST 08/10/2023 22  0 - 40 IU/L Final    ALT 08/10/2023 15  0 - 32 IU/L Final    Cholesterol 08/10/2023 202 (H)  100 - 199 mg/dL Final    Triglycerides 08/10/2023 52  0 - 149 mg/dL Final    HDL 08/10/2023 54  >39 mg/dL Final    VLDL Cholesterol Bobby 08/10/2023 9  5 - 40 mg/dL Final    LDL Calculated 08/10/2023 139 (H)  0 - 99 mg/dL Final    TSH 08/10/2023 4.520 (H)  0.450 - 4.500 uIU/mL Final    T4, Free 08/10/2023 1.20  0.82 - 1.77 ng/dL Final    WBC 08/10/2023 4.4  3.4 - 10.8 x10E3/uL Final    RBC 08/10/2023 4.63  3.77 - 5.28 x10E6/uL Final    Hemoglobin 08/10/2023 12.9  11.1 - 15.9 g/dL Final    Hematocrit 08/10/2023 39.9  34.0 - 46.6 % Final    MCV 08/10/2023 86  79 - 97 fL Final    MCH 08/10/2023 27.9  26.6 - 33.0 pg Final    MCHC 08/10/2023 32.3  31.5 - 35.7 g/dL Final    RDW 08/10/2023 12.9  11.7 - 15.4 % Final    Platelets 08/10/2023 283  150 - 450 x10E3/uL Final    Neutrophils 08/10/2023 44  Not Estab. % Final    Lymphs 08/10/2023 41  Not Estab. % Final    Monocytes 08/10/2023 12  Not Estab. % Final    Eos 08/10/2023 2  Not Estab. % Final    Basos 08/10/2023 1  Not Estab. % Final    Neutrophils (Absolute) 08/10/2023 1.9  1.4 - 7.0 x10E3/uL Final    Lymphs (Absolute) 08/10/2023 1.8  0.7 - 3.1 x10E3/uL Final    Monocytes(Absolute) 08/10/2023 0.5  0.1 - 0.9 x10E3/uL Final    Eos (Absolute) 08/10/2023 0.1  0.0 - 0.4 x10E3/uL Final    Baso (Absolute) 08/10/2023 0.1   0.0 - 0.2 x10E3/uL Final    Immature Granulocytes 08/10/2023 0  Not Estab. % Final    Immature Grans (Abs) 08/10/2023 0.0  0.0 - 0.1 x10E3/uL Final    Hemoglobin A1c 08/10/2023 5.6  4.8 - 5.6 % Final    Specific Gravity, UA 08/10/2023 1.015  1.005 - 1.030 Final    pH, UA 08/10/2023 6.0  5.0 - 7.5 Final    Color, UA 08/10/2023 Yellow  Yellow Final    Clarity, UA 08/10/2023 Clear  Clear Final    Leukocytes, UA 08/10/2023 Negative  Negative Final    Protein, UA 08/10/2023 Negative  Negative/Trace Final    Glucose, UA 08/10/2023 Negative  Negative Final    Ketones, UA 08/10/2023 Negative  Negative Final    Occult Blood UA 08/10/2023 Negative  Negative Final    Bilirubin, UA 08/10/2023 Negative  Negative Final    Urobilinogen, UA 08/10/2023 0.2  0.2 - 1.0 mg/dL Final    Nitrite, UA 08/10/2023 Negative  Negative Final    Microscopic Examination 08/10/2023 Comment   Final    BNP 08/10/2023 180.5 (H)  0.0 - 100.0 pg/mL Final      Assessment:       1. Annual physical exam    2. DDD (degenerative disc disease), lumbar    3. Abnormal TSH    4. Elevated brain natriuretic peptide (BNP) level    5. Panic anxiety syndrome    6. Encounter for long-term current use of high risk medication    7. Encounter for therapeutic drug monitoring    8. TRINIDAD (dyspnea on exertion)    9. Former smoker        Plan:       Annual physical exam  -     Urinalysis, Reflex to Urine Culture Urine, Clean Catch; Future; Expected date: 01/10/2024  -     Cancel: X-Ray Chest PA And Lateral; Future; Expected date: 01/10/2024    DDD (degenerative disc disease), lumbar    Abnormal TSH  -     T4, FREE; Future; Expected date: 01/10/2024  -     TSH; Future; Expected date: 01/10/2024    Elevated brain natriuretic peptide (BNP) level  -     Cancel: X-Ray Chest PA And Lateral; Future; Expected date: 01/10/2024    Panic anxiety syndrome  -     ALPRAZolam (XANAX) 0.5 MG tablet; Take 1 tablet (0.5 mg total) by mouth 2 (two) times daily as needed for Anxiety.  Dispense: 60  tablet; Refill: 5  -     DRUG MONITOR, PANEL 4, W/CONF, URINE; Future; Expected date: 01/10/2024    Encounter for long-term current use of high risk medication  -     DRUG MONITOR, PANEL 4, W/CONF, URINE; Future; Expected date: 01/10/2024    Encounter for therapeutic drug monitoring  -     DRUG MONITOR, PANEL 4, W/CONF, URINE; Future; Expected date: 01/10/2024    TRINIDAD (dyspnea on exertion)  -     Cancel: X-Ray Chest PA And Lateral; Future; Expected date: 01/10/2024    Former smoker  -     CT Chest Lung Screening Low Dose; Future; Expected date: 01/10/2024        Counseled on age and gender appropriate medical preventative services, including cancer screenings, immunizations, overall nutritional health, need for a consistent exercise regimen and an overall push towards maintaining a vigorous and active lifestyle.      Follow up in about 6 months (around 7/10/2024) for anxiet.

## 2024-01-18 ENCOUNTER — HOSPITAL ENCOUNTER (OUTPATIENT)
Dept: RADIOLOGY | Facility: HOSPITAL | Age: 64
Discharge: HOME | End: 2024-01-18
Attending: FAMILY MEDICINE
Payer: MEDICAID

## 2024-01-18 DIAGNOSIS — Z87.891 FORMER SMOKER: ICD-10-CM

## 2024-01-18 PROCEDURE — 71271 CT THORAX LUNG CANCER SCR C-: CPT | Mod: TC,PO

## 2024-01-18 NOTE — PROGRESS NOTES
Please call patient.  There was a 5 mm nodule. Follow-up with PCP.  Needs repeat CT scan in 6 months.

## 2024-01-19 ENCOUNTER — TELEPHONE (OUTPATIENT)
Dept: FAMILY MEDICINE | Facility: CLINIC | Age: 64
End: 2024-01-19
Payer: MEDICAID

## 2024-01-19 LAB
T4 FREE SERPL-MCNC: 1 NG/DL (ref 0.8–1.8)
TSH SERPL-ACNC: 6.01 MIU/L (ref 0.4–4.5)

## 2024-01-19 NOTE — TELEPHONE ENCOUNTER
Attempted to call patient back with results received voicemail. Message and call back number was left for patient.

## 2024-01-19 NOTE — TELEPHONE ENCOUNTER
----- Message from Terrell Newton NP sent at 1/18/2024  5:16 PM CST -----  Please call patient.  There was a 5 mm nodule. Follow-up with PCP.  Needs repeat CT scan in 6 months.

## 2024-01-21 LAB
1OH-MIDAZOLAM UR-MCNC: NEGATIVE NG/ML
7AMINOCLONAZEPAM UR-MCNC: NEGATIVE NG/ML
A-OH ALPRAZ UR-MCNC: 112 NG/ML
A-OH-TRIAZOLAM UR-MCNC: NEGATIVE NG/ML
AMPHETAMINES UR QL: NEGATIVE NG/ML
APPEARANCE UR: CLEAR
BACTERIA #/AREA URNS HPF: NORMAL /HPF
BACTERIA UR CULT: NORMAL
BARBITURATES UR QL: NEGATIVE NG/ML
BENZODIAZ UR QL: POSITIVE NG/ML
BILIRUB UR QL STRIP: NEGATIVE
BZE UR QL: NEGATIVE NG/ML
COLOR UR: YELLOW
CREAT UR-MCNC: 33.2 MG/DL
DRUG SCREEN COMMENT UR-IMP: ABNORMAL
GLUCOSE UR QL STRIP: NEGATIVE
HGB UR QL STRIP: NEGATIVE
HYALINE CASTS #/AREA URNS LPF: NORMAL /LPF
KETONES UR QL STRIP: NEGATIVE
LEUKOCYTE ESTERASE UR QL STRIP: NEGATIVE
LORAZEPAM UR-MCNC: NEGATIVE NG/ML
METHADONE UR QL: NEGATIVE NG/ML
NITRITE UR QL STRIP: NEGATIVE
NORDIAZEPAM UR-MCNC: NEGATIVE NG/ML
NOTES AND COMMENTS: ABNORMAL
OH-ETHYLFLURAZ UR-MCNC: NEGATIVE NG/ML
OPIATES UR QL: NEGATIVE NG/ML
OXAZEPAM UR-MCNC: NEGATIVE NG/ML
OXIDANTS UR QL: NEGATIVE MCG/ML
OXYCODONE UR QL: NEGATIVE NG/ML
PCP UR QL: NEGATIVE NG/ML
PH UR STRIP: 5.5 [PH] (ref 5–8)
PH UR: 5.2 [PH] (ref 4.5–9)
PROT UR QL STRIP: NEGATIVE
RBC #/AREA URNS HPF: NORMAL /HPF
SERVICE CMNT-IMP: NORMAL
SP GR UR STRIP: 1.01 (ref 1–1.03)
SQUAMOUS #/AREA URNS HPF: NORMAL /HPF
TEMAZEPAM UR-MCNC: NEGATIVE NG/ML
WBC #/AREA URNS HPF: NORMAL /HPF

## 2024-01-22 ENCOUNTER — TELEPHONE (OUTPATIENT)
Dept: FAMILY MEDICINE | Facility: CLINIC | Age: 64
End: 2024-01-22
Payer: MEDICAID

## 2024-02-06 NOTE — TELEPHONE ENCOUNTER
Informed pt that Dr. Isaacs has not resulted yet. Will get this to Dr. Isaacs and call back this after noon. Pt voiced understanding.

## 2024-02-06 NOTE — TELEPHONE ENCOUNTER
prescription sent to   [id number] - [place], [place] - [address]  AT [facility]  [address] [facility] [address]  Phone: [phone] Fax: [phone]

## 2024-02-06 NOTE — TELEPHONE ENCOUNTER
Per Dr. Isaacs 5 mm nodule repeat in 6 months. Reminder created. Pt states she would like Dr. Isaacs to consider an albuterol inhaler for the cold seasons. States she noticed she has some labored breathing when the air is cold. Does not have SOB or difficulty breathing any other time.

## 2024-02-06 NOTE — TELEPHONE ENCOUNTER
----- Message from Carmencita Andres sent at 2/6/2024  2:36 PM CST -----  Pt is calling back to make sure she is getting a call back today. She knows we are busy but she has questions that need to be answered   [phone]

## 2024-02-12 ENCOUNTER — TELEPHONE (OUTPATIENT)
Dept: FAMILY MEDICINE | Facility: CLINIC | Age: 64
End: 2024-02-12
Payer: MEDICAID

## 2024-02-12 RX ORDER — ALBUTEROL SULFATE 90 UG/1
2 AEROSOL, METERED RESPIRATORY (INHALATION) EVERY 6 HOURS PRN
Qty: 18 G | Refills: 0 | Status: SHIPPED | OUTPATIENT
Start: 2024-02-12 | End: 2024-02-12 | Stop reason: SDUPTHER

## 2024-02-12 RX ORDER — ALBUTEROL SULFATE 90 UG/1
2 AEROSOL, METERED RESPIRATORY (INHALATION) EVERY 6 HOURS PRN
Qty: 8 G | Refills: 0 | Status: SHIPPED | OUTPATIENT
Start: 2024-02-12 | End: 2025-02-11

## 2024-02-12 NOTE — TELEPHONE ENCOUNTER
----- Message from Lashell Dunn sent at 2/12/2024  9:21 AM CST -----  Pt needs an RX for the generic proair HFA inhaler sent to pharmacy. Pharmacy did not fill. They put it back on the shelf. Yasmeen on front. Please advise. Pt #[phone]

## 2024-02-12 NOTE — TELEPHONE ENCOUNTER
----- Message from Lashell Dunn sent at 2/12/2024  9:21 AM CST -----  Pt needs an RX for the generic proair HFA inhaler sent to pharmacy. Pharmacy did not fill. They put it back on the shelf. Yasmeen on front. Please advise. Pt #[phone]

## 2024-03-15 ENCOUNTER — TELEPHONE (OUTPATIENT)
Dept: FAMILY MEDICINE | Facility: CLINIC | Age: 64
End: 2024-03-15
Payer: MEDICAID

## 2024-03-15 NOTE — TELEPHONE ENCOUNTER
----- Message from Prudence Bhandari sent at 3/15/2024  8:20 AM CDT -----  The patient had a sinus infection last week. Now she is having bloating in her chest down to her abdomen. Her stool is very soft and dark. She ate corn last week and she has still has not seen it come out yet.  Please call .  She was not sure if she has some kind of blockage. Pt's # [phone]

## 2024-03-15 NOTE — TELEPHONE ENCOUNTER
Spoke with patient c/o sinus infection last week.  States xDays lots of bloating breast to lower stomach whether she eats or not.  States that did not concern her much.  States she ate corn days ago, and soft stools in the past 12 hours - addie/tan looking.  States she is concerned because she has not seen the corn come out.  Unsure if she may have a blockage.  No pain just bloating and discomfort.  Please advise.  -DN    PRINTED

## 2024-03-15 NOTE — TELEPHONE ENCOUNTER
Per dr pak:  recommend using some milk of magnesia to clean her system and start a probiotic for a few weeks to help gut health.     Called patient, no answer.  Left message for patient to return call -DN

## 2024-03-28 ENCOUNTER — PATIENT OUTREACH (OUTPATIENT)
Dept: ADMINISTRATIVE | Facility: HOSPITAL | Age: 64
End: 2024-03-28
Payer: MEDICAID

## 2024-03-28 NOTE — LETTER
March 28, 2024    Mily Long  [address] [address]             [facility]  [address]  [facility] [address]  Phone: [phone] Dear Cindy, [facility] is committed to your overall health and would like to ensure that you are up to date on your recommended test and/or procedures.   Donald Isaacs MD has found that your chart shows you may be due for the following:      Pap smear  Mammogram ( Breast Cancer Screening)       If you have had any of the above done at another facility, please let us know so that we may obtain copies from that facility.  If you have a copy of these records, please provide a copy so that we may update your records.  Also, You are welcome to request that the report be faxed to us at   [phone].    If you have an upcoming scheduled appointment for the above test and/or procedures, please disregard this letter.  Otherwise, please contact us through your [facility] portal or by calling [phone] and we will assist in scheduling these appointments for you.      Thank you for letting us care for you,    Sincerely,    Your [facility] Team    Phone#  [phone]  FAX#  [phone]

## 2024-03-28 NOTE — PROGRESS NOTES
Population Health Chart Review & Patient Outreach Details      Additional Pop Health Notes:    Declined Mammo and Pap Smear recent office notes           Updates Requested / Reviewed:      Updated Care Coordination Note, Care Everywhere, , and External Sources: [facility], [employer], and [facility]         Health Maintenance Topics Overdue:      VBHM Score: 2     Cervical Cancer Screening  Mammogram    Influenza Vaccine  Tetanus Vaccine  Shingles/Zoster Vaccine  RSV Vaccine                  Health Maintenance Topic(s) Outreach Outcomes & Actions Taken:    Breast Cancer Screening - Outreach Outcomes & Actions Taken  : Pt Declined Scheduling Mammogram    Cervical Cancer Screening - Outreach Outcomes & Actions Taken  : Pt Declined Scheduling Pap Smear/HPV

## 2024-07-09 ENCOUNTER — PATIENT OUTREACH (OUTPATIENT)
Dept: ADMINISTRATIVE | Facility: HOSPITAL | Age: 64
End: 2024-07-09
Payer: MEDICAID

## 2024-07-09 NOTE — PROGRESS NOTES
Population Health Chart Review & Patient Outreach Details      Additional Pop Health Notes:    [facility] cervical gap report. No pap found in [facility], [facility], care everywhere or media           Updates Requested / Reviewed:      Updated Care Coordination Note and          Health Maintenance Topics Overdue:      [facility] Score: 2     Cervical Cancer Screening  Mammogram    Tetanus Vaccine  Shingles/Zoster Vaccine  RSV Vaccine                  Health Maintenance Topic(s) Outreach Outcomes & Actions Taken:       Medication:   Requested Prescriptions     Pending Prescriptions Disp Refills    lisinopril (PRINIVIL;ZESTRIL) 5 MG tablet [Pharmacy Med Name: LISINOPRIL 5 MG TABLET] 30 tablet 2     Sig: TAKE 1 TABLET BY MOUTH EVERY DAY     Last Filled:  11/23/21    Last appt: 12/2/2021   Next appt: Visit date not found    Last OARRS: No flowsheet data found.

## 2024-07-09 NOTE — PROGRESS NOTES
Population Health Chart Review & Patient Outreach Details      Additional Pop Health Notes:    [facility] cervical gap report. No pap found in care everywhere, media, [facility] or [facility] as of 7/9/24.           Updates Requested / Reviewed:      Updated Care Coordination Note, Care Everywhere, , and External Sources: [facility] and [facility]         Health Maintenance Topics Overdue:      [facility] Score: 2     Cervical Cancer Screening  Mammogram    Tetanus Vaccine  Shingles/Zoster Vaccine  RSV Vaccine                  Health Maintenance Topic(s) Outreach Outcomes & Actions Taken:

## 2024-07-10 ENCOUNTER — OFFICE VISIT (OUTPATIENT)
Dept: FAMILY MEDICINE | Facility: CLINIC | Age: 64
End: 2024-07-10
Payer: MEDICAID

## 2024-07-10 VITALS
OXYGEN SATURATION: 98 % | BODY MASS INDEX: 26.01 KG/M2 | DIASTOLIC BLOOD PRESSURE: 70 MMHG | SYSTOLIC BLOOD PRESSURE: 116 MMHG | HEART RATE: 60 BPM | WEIGHT: 146.81 LBS | HEIGHT: 63 IN

## 2024-07-10 DIAGNOSIS — E03.9 ACQUIRED HYPOTHYROIDISM: ICD-10-CM

## 2024-07-10 DIAGNOSIS — Z87.891 FORMER SMOKER: ICD-10-CM

## 2024-07-10 DIAGNOSIS — R06.09 DOE (DYSPNEA ON EXERTION): ICD-10-CM

## 2024-07-10 DIAGNOSIS — R91.1 LUNG NODULE: ICD-10-CM

## 2024-07-10 DIAGNOSIS — R00.2 PALPITATIONS: Primary | ICD-10-CM

## 2024-07-10 DIAGNOSIS — R91.1 SOLITARY PULMONARY NODULE: ICD-10-CM

## 2024-07-10 DIAGNOSIS — G47.9 SLEEP DISTURBANCE: ICD-10-CM

## 2024-07-10 DIAGNOSIS — Z12.31 ENCOUNTER FOR SCREENING MAMMOGRAM FOR BREAST CANCER: ICD-10-CM

## 2024-07-10 DIAGNOSIS — F41.0 PANIC ANXIETY SYNDROME: ICD-10-CM

## 2024-07-10 PROCEDURE — 1160F RVW MEDS BY RX/DR IN RCRD: CPT | Mod: CPTII,S$GLB,, | Performed by: FAMILY MEDICINE

## 2024-07-10 PROCEDURE — 99214 OFFICE O/P EST MOD 30 MIN: CPT | Mod: S$GLB,,, | Performed by: FAMILY MEDICINE

## 2024-07-10 PROCEDURE — 1159F MED LIST DOCD IN RCRD: CPT | Mod: CPTII,S$GLB,, | Performed by: FAMILY MEDICINE

## 2024-07-10 PROCEDURE — 3078F DIAST BP <80 MM HG: CPT | Mod: CPTII,S$GLB,, | Performed by: FAMILY MEDICINE

## 2024-07-10 PROCEDURE — 3008F BODY MASS INDEX DOCD: CPT | Mod: CPTII,S$GLB,, | Performed by: FAMILY MEDICINE

## 2024-07-10 PROCEDURE — 3074F SYST BP LT 130 MM HG: CPT | Mod: CPTII,S$GLB,, | Performed by: FAMILY MEDICINE

## 2024-07-10 RX ORDER — TRAZODONE HYDROCHLORIDE 50 MG/1
50 TABLET ORAL NIGHTLY
Qty: 30 TABLET | Refills: 11 | Status: SHIPPED | OUTPATIENT
Start: 2024-07-10 | End: 2025-07-10

## 2024-07-10 RX ORDER — LEVOTHYROXINE SODIUM 50 UG/1
50 TABLET ORAL NIGHTLY
Qty: 30 TABLET | Refills: 11 | Status: SHIPPED | OUTPATIENT
Start: 2024-07-10 | End: 2025-07-10

## 2024-07-10 RX ORDER — ALPRAZOLAM 0.5 MG/1
0.5 TABLET ORAL 2 TIMES DAILY PRN
Qty: 60 TABLET | Refills: 3 | Status: SHIPPED | OUTPATIENT
Start: 2024-07-10

## 2024-07-10 NOTE — PROGRESS NOTES
SUBJECTIVE:    Patient ID: Mily Long is a 64 y.o. female.    Chief Complaint: Follow-up (No bottles//Pt is here for a check up//Pt would like to discuss that when she wake up, its hard for her to regulate her heart rate//Pt would like to discuss her insomnia//decreased appetite//discuss her balance//Medication refills//Mammo ordered today//Discuss screening lung scan//SUZANNE )    Patient has self retired for the past 6 months due to high stress job. She has been not feeling well and having difficulty initiating and maintaining sleep. Does continue to take xanax nightl. This has been an issue for the past 6 months  Feels like her heart is racing in the morning . Also reports intermittent feelings of anxiety associated with palpitations. Reports some social phobia     Currently wears bifocals . Notes has some issues with balance when sitting too long     Reports going to gym routinely. Has decreased appetite. Had to use her inhaler after while cutting grass. Doesn't overexert herself when in the gym . Also swims and has no SOB or palpitations. Can't hold he breath long      Has neck pain but feels recurrent ibuprofen increases her BP , has reports some muscle aches and dehydration. Takes several supplements        Past Medical History:   Diagnosis Date    DDD (degenerative disc disease), cervical     DDD (degenerative disc disease), lumbar     Insomnia     Panic anxiety syndrome     Spondyloarthritis      Past Surgical History:   Procedure Laterality Date     SECTION       Family History   Problem Relation Name Age of Onset    Diabetes Father         Marital Status: Single  Alcohol History:  reports that she does not currently use alcohol.  Tobacco History:  reports that she quit smoking about 7 years ago. Her smoking use included cigarettes. She started smoking about 42 years ago. She has a 35 pack-year smoking history. She has never used smokeless tobacco.  Drug History:  reports no history of drug  "use.    Review of patient's allergies indicates:   Allergen Reactions    Codeine     Hydrocodone        Current Outpatient Medications:     acetaminophen (TYLENOL) 500 MG tablet, Take 500 mg by mouth every 8 (eight) hours as needed for Pain., Disp: , Rfl:     albuterol (PROAIR HFA) 90 mcg/actuation inhaler, Inhale 2 puffs into the lungs every 6 (six) hours as needed for Wheezing. Rescue, Disp: 8 g, Rfl: 0    ALPRAZolam (XANAX) 0.5 MG tablet, Take 1 tablet (0.5 mg total) by mouth 2 (two) times daily as needed for Anxiety., Disp: 60 tablet, Rfl: 3    ascorbic acid/collagen hydr (COLLAGEN SKIN RENEWAL ORAL), Take by mouth Daily., Disp: , Rfl:     Bacillus coagulans (PROBIOTIC, B. COAGULANS, ORAL), Take by mouth Daily., Disp: , Rfl:     calcium carbonate (OS-JOSH) 500 mg calcium (1,250 mg) tablet, Take 1 tablet by mouth once daily., Disp: , Rfl:     levothyroxine (SYNTHROID) 50 MCG tablet, Take 1 tablet (50 mcg total) by mouth nightly., Disp: 30 tablet, Rfl: 11    POTASSIUM ORAL, Take by mouth Daily., Disp: , Rfl:     traZODone (DESYREL) 50 MG tablet, Take 1 tablet (50 mg total) by mouth every evening. One hour prior to bedtime, Disp: 30 tablet, Rfl: 11    Review of Systems       Objective:          1/10/2024     1:55 PM 7/10/2024    10:13 AM   Vitals - 1 value per visit   SYSTOLIC 112 116   DIASTOLIC 62 70   Pulse 80 60   SPO2 99 % 98 %   Weight (lb) 146 146.8   Weight (kg) 66.225 66.588   Height 5' 3" (1.6 m) 5' 3" (1.6 m)   BMI (Calculated) 25.9 26   Pain Score  Zero      Physical Exam  Constitutional:       Appearance: Normal appearance.   HENT:      Head: Normocephalic and atraumatic.      Mouth/Throat:      Mouth: Mucous membranes are moist.   Eyes:      Conjunctiva/sclera: Conjunctivae normal.   Cardiovascular:      Rate and Rhythm: Bradycardia present.   Pulmonary:      Effort: Pulmonary effort is normal.      Breath sounds: Normal breath sounds.   Neurological:      General: No focal deficit present.      Mental " Status: She is alert and oriented to person, place, and time.   Psychiatric:         Mood and Affect: Mood normal.         Behavior: Behavior normal.           Assessment:       1. Palpitations    2. Encounter for screening mammogram for breast cancer    3. Panic anxiety syndrome    4. Lung nodule    5. Former smoker    6. Solitary pulmonary nodule    7. Acquired hypothyroidism    8. TRINIDAD (dyspnea on exertion)    9. Sleep disturbance         Plan:       Palpitations  -     Comprehensive Metabolic Panel; Future; Expected date: 07/10/2024    Encounter for screening mammogram for breast cancer  -     Mammo Digital Screening Bilat; Future; Expected date: 07/10/2024    Panic anxiety syndrome  -     ALPRAZolam (XANAX) 0.5 MG tablet; Take 1 tablet (0.5 mg total) by mouth 2 (two) times daily as needed for Anxiety.  Dispense: 60 tablet; Refill: 3    Lung nodule  -     CT Chest Lung Screening Low Dose; Future; Expected date: 07/10/2024    Former smoker  -     CT Chest Lung Screening Low Dose; Future; Expected date: 07/10/2024    Solitary pulmonary nodule  -     CT Chest Lung Screening Low Dose; Future; Expected date: 07/10/2024    Acquired hypothyroidism  -     TSH; Future; Expected date: 07/10/2024  -     T4, Free; Future; Expected date: 07/10/2024  -     levothyroxine (SYNTHROID) 50 MCG tablet; Take 1 tablet (50 mcg total) by mouth nightly.  Dispense: 30 tablet; Refill: 11    TRINIDAD (dyspnea on exertion)  -     B-TYPE NATRIURETIC PEPTIDE; Future; Expected date: 07/10/2024    Sleep disturbance  -     traZODone (DESYREL) 50 MG tablet; Take 1 tablet (50 mg total) by mouth every evening. One hour prior to bedtime  Dispense: 30 tablet; Refill: 11      Medication List with Changes/Refills   New Medications    LEVOTHYROXINE (SYNTHROID) 50 MCG TABLET    Take 1 tablet (50 mcg total) by mouth nightly.    TRAZODONE (DESYREL) 50 MG TABLET    Take 1 tablet (50 mg total) by mouth every evening. One hour prior to bedtime   Current Medications     ACETAMINOPHEN (TYLENOL) 500 MG TABLET    Take 500 mg by mouth every 8 (eight) hours as needed for Pain.    ALBUTEROL (PROAIR HFA) 90 MCG/ACTUATION INHALER    Inhale 2 puffs into the lungs every 6 (six) hours as needed for Wheezing. Rescue    ASCORBIC ACID/COLLAGEN HYDR (COLLAGEN SKIN RENEWAL ORAL)    Take by mouth Daily.    BACILLUS COAGULANS (PROBIOTIC, B. COAGULANS, ORAL)    Take by mouth Daily.    CALCIUM CARBONATE (OS-JOSH) 500 MG CALCIUM (1,250 MG) TABLET    Take 1 tablet by mouth once daily.    POTASSIUM ORAL    Take by mouth Daily.   Changed and/or Refilled Medications    Modified Medication Previous Medication    ALPRAZOLAM (XANAX) 0.5 MG TABLET ALPRAZolam (XANAX) 0.5 MG tablet       Take 1 tablet (0.5 mg total) by mouth 2 (two) times daily as needed for Anxiety.    Take 1 tablet (0.5 mg total) by mouth 2 (two) times daily as needed for Anxiety.      Follow up in about 4 months (around 11/10/2024) for thyroid .      Stable on medications continue current

## 2024-07-11 LAB
ALBUMIN SERPL-MCNC: 4.2 G/DL (ref 3.6–5.1)
ALBUMIN/GLOB SERPL: 1.6 (CALC) (ref 1–2.5)
ALP SERPL-CCNC: 103 U/L (ref 37–153)
ALT SERPL-CCNC: 15 U/L (ref 6–29)
AST SERPL-CCNC: 21 U/L (ref 10–35)
BILIRUB SERPL-MCNC: 0.4 MG/DL (ref 0.2–1.2)
BNP SERPL-MCNC: 87 PG/ML
BUN SERPL-MCNC: 10 MG/DL (ref 7–25)
BUN/CREAT SERPL: NORMAL (CALC) (ref 6–22)
CALCIUM SERPL-MCNC: 9 MG/DL (ref 8.6–10.4)
CHLORIDE SERPL-SCNC: 102 MMOL/L (ref 98–110)
CO2 SERPL-SCNC: 28 MMOL/L (ref 20–32)
CREAT SERPL-MCNC: 0.88 MG/DL (ref 0.5–1.05)
EGFR: 74 ML/MIN/1.73M2
GLOBULIN SER CALC-MCNC: 2.7 G/DL (CALC) (ref 1.9–3.7)
GLUCOSE SERPL-MCNC: 88 MG/DL (ref 65–99)
POTASSIUM SERPL-SCNC: 4.4 MMOL/L (ref 3.5–5.3)
PROT SERPL-MCNC: 6.9 G/DL (ref 6.1–8.1)
SODIUM SERPL-SCNC: 139 MMOL/L (ref 135–146)
T4 FREE SERPL-MCNC: 1.2 NG/DL (ref 0.8–1.8)
TSH SERPL-ACNC: 9.46 MIU/L (ref 0.4–4.5)

## 2024-07-12 ENCOUNTER — TELEPHONE (OUTPATIENT)
Dept: FAMILY MEDICINE | Facility: CLINIC | Age: 64
End: 2024-07-12
Payer: MEDICAID

## 2024-07-12 NOTE — TELEPHONE ENCOUNTER
Per dr pak:  thyroid meds started at visit.  Inform her she has underactive thyroid and take medications as directed.   Repeat tsh before 12/5/24 visit.

## 2024-07-12 NOTE — TELEPHONE ENCOUNTER
----- Message from [facility], RT sent at 7/12/2024  9:47 AM CDT -----  TSH=9.46, borderline urgent

## 2024-07-22 ENCOUNTER — HOSPITAL ENCOUNTER (OUTPATIENT)
Dept: RADIOLOGY | Facility: HOSPITAL | Age: 64
Discharge: HOME | End: 2024-07-22
Attending: FAMILY MEDICINE
Payer: MEDICAID

## 2024-07-22 VITALS — BODY MASS INDEX: 25.87 KG/M2 | HEIGHT: 63 IN | WEIGHT: 146 LBS

## 2024-07-22 DIAGNOSIS — R91.1 SOLITARY PULMONARY NODULE: ICD-10-CM

## 2024-07-22 DIAGNOSIS — Z12.31 ENCOUNTER FOR SCREENING MAMMOGRAM FOR BREAST CANCER: ICD-10-CM

## 2024-07-22 DIAGNOSIS — Z87.891 FORMER SMOKER: ICD-10-CM

## 2024-07-22 DIAGNOSIS — R91.1 LUNG NODULE: ICD-10-CM

## 2024-07-22 PROCEDURE — 71250 CT THORAX DX C-: CPT | Mod: TC,PO

## 2024-07-22 PROCEDURE — 77063 BREAST TOMOSYNTHESIS BI: CPT | Mod: TC,PO

## 2024-07-22 PROCEDURE — 71250 CT THORAX DX C-: CPT | Mod: 26,,, | Performed by: RADIOLOGY

## 2024-07-22 PROCEDURE — 77067 SCR MAMMO BI INCL CAD: CPT | Mod: 26,,, | Performed by: RADIOLOGY

## 2024-07-22 PROCEDURE — 77067 SCR MAMMO BI INCL CAD: CPT | Mod: TC,PO

## 2024-07-22 PROCEDURE — 77063 BREAST TOMOSYNTHESIS BI: CPT | Mod: 26,,, | Performed by: RADIOLOGY

## 2024-07-23 ENCOUNTER — TELEPHONE (OUTPATIENT)
Dept: RADIOLOGY | Facility: HOSPITAL | Age: 64
End: 2024-07-23

## 2024-07-26 NOTE — TELEPHONE ENCOUNTER
----- Message from Carmencita Andres sent at 2/6/2024  8:26 AM CST -----  Pt would like to talk to someone about her ct scan results. She called a week ago to get the results and has not heard anything   [phone]     Yes past 3 months

## 2024-08-01 ENCOUNTER — HOSPITAL ENCOUNTER (OUTPATIENT)
Dept: RADIOLOGY | Facility: HOSPITAL | Age: 64
Discharge: HOME | End: 2024-08-01
Attending: FAMILY MEDICINE
Payer: MEDICAID

## 2024-08-01 DIAGNOSIS — R92.8 ABNORMAL MAMMOGRAM: ICD-10-CM

## 2024-08-01 PROCEDURE — 77066 DX MAMMO INCL CAD BI: CPT | Mod: TC,PO

## 2024-08-01 PROCEDURE — 77062 BREAST TOMOSYNTHESIS BI: CPT | Mod: TC,PO

## 2024-08-01 PROCEDURE — 76642 ULTRASOUND BREAST LIMITED: CPT | Mod: TC,PO,RT

## 2024-08-01 PROCEDURE — 76642 ULTRASOUND BREAST LIMITED: CPT | Mod: 26,RT,, | Performed by: RADIOLOGY

## 2024-08-01 PROCEDURE — 77066 DX MAMMO INCL CAD BI: CPT | Mod: 26,,, | Performed by: RADIOLOGY

## 2024-08-01 PROCEDURE — 77062 BREAST TOMOSYNTHESIS BI: CPT | Mod: 26,,, | Performed by: RADIOLOGY

## 2024-08-14 ENCOUNTER — HOSPITAL ENCOUNTER (OUTPATIENT)
Dept: RADIOLOGY | Facility: HOSPITAL | Age: 64
Discharge: HOME | End: 2024-08-14
Attending: FAMILY MEDICINE
Payer: MEDICAID

## 2024-08-14 DIAGNOSIS — R92.8 ABNORMAL MAMMOGRAM: ICD-10-CM

## 2024-08-14 PROCEDURE — 19081 BX BREAST 1ST LESION STRTCTC: CPT | Mod: PO,LT

## 2024-08-14 PROCEDURE — 19081 BX BREAST 1ST LESION STRTCTC: CPT | Mod: LT,,, | Performed by: RADIOLOGY

## 2024-08-14 PROCEDURE — 25000003 PHARM REV CODE 250: Mod: PO | Performed by: RADIOLOGY

## 2024-08-14 RX ORDER — LIDOCAINE HCL/EPINEPHRINE/PF 2%-1:200K
VIAL (ML) INJECTION
Status: COMPLETED | OUTPATIENT
Start: 2024-08-14 | End: 2024-08-14

## 2024-08-14 RX ORDER — LIDOCAINE HYDROCHLORIDE 10 MG/ML
INJECTION, SOLUTION EPIDURAL; INFILTRATION; INTRACAUDAL; PERINEURAL
Status: COMPLETED | OUTPATIENT
Start: 2024-08-14 | End: 2024-08-14

## 2024-08-14 RX ADMIN — LIDOCAINE HYDROCHLORIDE 10 ML: 10 INJECTION, SOLUTION EPIDURAL; INFILTRATION; INTRACAUDAL at 01:08

## 2024-08-14 RX ADMIN — LIDOCAINE HYDROCHLORIDE,EPINEPHRINE BITARTRATE 10 ML: 20; .005 INJECTION, SOLUTION EPIDURAL; INFILTRATION; INTRACAUDAL; PERINEURAL at 01:08

## 2024-08-14 NOTE — PLAN OF CARE
Tolerated well.  Steri-strips, gauze and tegaderm dressing placed to procedural site-left breast.  Ice pack provided.  Verbal and written discharge instructions given and patient verbalized understanding.  Denies pain or nausea.  Discharged to home via private vehicle

## 2024-08-14 NOTE — PLAN OF CARE
Ambulated to [facility].  Id'd x 2 pt identifiers.  AAO x 3. HOOVER x4. Resp REU.   Denies pain or nausea. All questions answered.

## 2024-08-20 ENCOUNTER — TELEPHONE (OUTPATIENT)
Dept: FAMILY MEDICINE | Facility: CLINIC | Age: 64
End: 2024-08-20
Payer: MEDICAID

## 2024-08-20 NOTE — TELEPHONE ENCOUNTER
----- Message from Erica Stout sent at 8/20/2024 12:23 PM CDT -----  Regarding: PT IS HERE ABOUT RESULTS  Pt IS HERE  had biopsy for us at [facility] and hasn't gotten the results  can someone come speak with her ?  Thanks kbb

## 2024-08-21 ENCOUNTER — TELEPHONE (OUTPATIENT)
Facility: CLINIC | Age: 64
End: 2024-08-21
Payer: MEDICAID

## 2024-08-21 NOTE — TELEPHONE ENCOUNTER
Spoke with pts son Feng (on communication consent) in regards to breast biopsy results returning negative for malignancy. He verbalized understanding.

## 2024-09-03 ENCOUNTER — TELEPHONE (OUTPATIENT)
Dept: RADIOLOGY | Facility: HOSPITAL | Age: 64
End: 2024-09-03

## 2024-09-24 ENCOUNTER — TELEPHONE (OUTPATIENT)
Dept: SURGERY | Facility: CLINIC | Age: 64
End: 2024-09-24
Payer: MEDICAID

## 2024-09-24 NOTE — TELEPHONE ENCOUNTER
----- Message from Luis Monet sent at 9/24/2024 11:08 AM CDT -----  Type: Needs Medical Advice  Who Called:  pt  Best Call Back Number: [phone]    Additional Information: Pt is calling the office needs to reschedule appt.Please call back and advise.

## 2024-12-05 ENCOUNTER — OFFICE VISIT (OUTPATIENT)
Dept: FAMILY MEDICINE | Facility: CLINIC | Age: 64
End: 2024-12-05
Payer: MEDICAID

## 2024-12-05 VITALS
OXYGEN SATURATION: 99 % | HEIGHT: 63 IN | SYSTOLIC BLOOD PRESSURE: 124 MMHG | BODY MASS INDEX: 26.75 KG/M2 | DIASTOLIC BLOOD PRESSURE: 74 MMHG | HEART RATE: 55 BPM | WEIGHT: 151 LBS

## 2024-12-05 DIAGNOSIS — F41.0 PANIC ANXIETY SYNDROME: ICD-10-CM

## 2024-12-05 DIAGNOSIS — M46.92 INFLAMMATORY SPONDYLOPATHY OF CERVICAL REGION: ICD-10-CM

## 2024-12-05 DIAGNOSIS — R92.8 ABNORMAL MAMMOGRAM: ICD-10-CM

## 2024-12-05 DIAGNOSIS — R91.1 LUNG NODULE: ICD-10-CM

## 2024-12-05 DIAGNOSIS — Z87.891 FORMER SMOKER: ICD-10-CM

## 2024-12-05 DIAGNOSIS — R33.9 URINARY RETENTION: ICD-10-CM

## 2024-12-05 DIAGNOSIS — E03.9 ACQUIRED HYPOTHYROIDISM: Primary | ICD-10-CM

## 2024-12-05 DIAGNOSIS — Z12.11 COLON CANCER SCREENING: ICD-10-CM

## 2024-12-05 DIAGNOSIS — M47.817 LUMBOSACRAL SPONDYLOSIS WITHOUT MYELOPATHY: ICD-10-CM

## 2024-12-05 DIAGNOSIS — G47.9 SLEEP DISTURBANCE: ICD-10-CM

## 2024-12-05 DIAGNOSIS — R91.8 OTHER NONSPECIFIC ABNORMAL FINDING OF LUNG FIELD: ICD-10-CM

## 2024-12-05 DIAGNOSIS — R91.1 SOLITARY PULMONARY NODULE: ICD-10-CM

## 2024-12-05 PROCEDURE — 3008F BODY MASS INDEX DOCD: CPT | Mod: CPTII,S$GLB,, | Performed by: FAMILY MEDICINE

## 2024-12-05 PROCEDURE — 3074F SYST BP LT 130 MM HG: CPT | Mod: CPTII,S$GLB,, | Performed by: FAMILY MEDICINE

## 2024-12-05 PROCEDURE — 3078F DIAST BP <80 MM HG: CPT | Mod: CPTII,S$GLB,, | Performed by: FAMILY MEDICINE

## 2024-12-05 PROCEDURE — 99214 OFFICE O/P EST MOD 30 MIN: CPT | Mod: S$GLB,,, | Performed by: FAMILY MEDICINE

## 2024-12-05 PROCEDURE — 1159F MED LIST DOCD IN RCRD: CPT | Mod: CPTII,S$GLB,, | Performed by: FAMILY MEDICINE

## 2024-12-05 PROCEDURE — 1160F RVW MEDS BY RX/DR IN RCRD: CPT | Mod: CPTII,S$GLB,, | Performed by: FAMILY MEDICINE

## 2024-12-05 RX ORDER — IBUPROFEN 800 MG/1
800 TABLET ORAL 3 TIMES DAILY
Qty: 90 TABLET | Refills: 3 | Status: SHIPPED | OUTPATIENT
Start: 2024-12-05

## 2024-12-05 RX ORDER — ALPRAZOLAM 0.5 MG/1
0.5 TABLET ORAL 2 TIMES DAILY PRN
Qty: 60 TABLET | Refills: 3 | Status: SHIPPED | OUTPATIENT
Start: 2024-12-05

## 2024-12-05 NOTE — PROGRESS NOTES
Patient ID: Mily Long is a 64 y.o. female.    Chief Complaint: Thyroid Check Up (-Declined Flu Vaccine /-Cologuard Order)    History of Present Illness    CHIEF COMPLAINT:  Patient presents today for follow-up on various health concerns.    THYROID:  She has a history of elevated TSH and was started on low dose levothyroxine after two consecutive elevated results. Since starting the medication, she reports improved appetite and now enjoys her food rather than eating out of necessity. Her energy level remains unchanged.    WEIGHT AND EXERCISE:  Her weight has been fluctuating between 147 and 151 lbs for years. She is currently doing exercises at home and walking for physical activity.    SLEEP:  She discontinued prescribed trazodone for sleep issues due to intolerable side effects after taking it for 2-3 weeks. Her sleep schedule remains unchanged. She currently manages fatigue by taking 15-minute power naps in the afternoon.    MUSCULOSKELETAL PAIN:  She experiences neck pain when sitting and watching TV, and lower back pain when walking. She takes ibuprofen 800mg for back pain, alternating between ibuprofen and Advil. She uses a heating pad and cold compress for pain relief, and tries to limit medication use unless necessary.    URINARY SYMPTOMS:  She reports feeling like her bladder is not emptying completely when urinating. She wakes up 3-4 times during the night to urinate. She experiences urinary urgency and frequency, noting that she often feels the need to urinate but produces only small amounts. Sometimes she needs to strain to initiate or complete urination. She denies any trouble holding urine.    BREAST HEALTH:  She had an abnormal mammogram which led to an ultrasound and subsequent biopsy. The biopsy results showed microcalcifications and cystic duct hyperplasia, but were negative for malignancy. Two tags were left in the breast tissue for future imaging reference. She reports a noticeable scar from  the biopsy procedure.    LUNG HEALTH:  She is a former smoker who quit approximately 8 years ago. An abnormal finding was noted on her CT lung screening in January. A follow-up regular CT was performed in July, after which it was recommended to return to low-dose CT screening for further monitoring.    MEDICATIONS:  She takes Xanax for anxiety and levothyroxine daily for hypothyroidism.      ROS:  General: -fever, -chills, +fatigue, -weight gain, -weight loss  Eyes: -vision changes, -redness, -discharge  ENT: -ear pain, -nasal congestion, -sore throat  Cardiovascular: -chest pain, -palpitations, -lower extremity edema  Respiratory: -cough, -shortness of breath  Gastrointestinal: -abdominal pain, -nausea, -vomiting, -diarrhea, -constipation, -blood in stool  Genitourinary: -dysuria, -hematuria, +frequency, +urgency  Musculoskeletal: -joint pain, -muscle pain, +neck pain, +back pain  Skin: -rash, -lesion  Neurological: -headache, -dizziness, -numbness, -tingling  Psychiatric: -anxiety, -depression, -sleep difficulty         Physical Exam    Vitals: BMI slightly elevated.  General: No acute distress. Well-developed. Well-nourished.  Eyes: EOMI. Sclerae anicteric.  HENT: Normocephalic. Atraumatic. Nares patent. Moist oral mucosa.  Ears: Bilateral TMs clear. Bilateral EACs clear.  Cardiovascular: Regular rate. Regular rhythm. No murmurs. No rubs. No gallops. Normal S1, S2.  Respiratory: Normal respiratory effort. Clear to auscultation bilaterally. No rales. No rhonchi. No wheezing.  Abdomen: Soft. Non-tender. Non-distended. Normoactive bowel sounds.  Musculoskeletal: No  obvious deformity.  Extremities: No lower extremity edema.  Neurological: Alert & oriented x3. No slurred speech. Normal gait.  Psychiatric: Normal mood. Normal affect. Good insight. Good judgment.  Skin: Warm. Dry. No rash.         Assessment & Plan    IMPRESSION:  - Continued levothyroxine 50 mcg for hypothyroidism; TSH ordered to assess dosage  adequacy  - Evaluated urinary symptoms suggestive of retention; referred to urology for further assessment  - Reviewed recent breast biopsy results: negative for malignancy  - Assessed lung nodule follow-up needs based on patient's former smoker status  - Considered pain management options for neck and back pain due to spondylosis and arthritis    HYPOTHYROIDISM:  - Continued levothyroxine 50 mcg daily for hypothyroidism.  - TSH blood test ordered to assess thyroid function and medication dosage.    URINARY RETENTION:  - Referred to Dr. Dawson Dougherty in urology for evaluation of urinary retention symptoms.    ANXIETY DISORDER:  - Refilled Xanax for anxiety disorder.    BACK AND NECK PAIN:  - Refilled ibuprofen 800 mg for neck and back pain.  - Can use OTC Tylenol as an alternative to ibuprofen when needed.  - Educated on safe use of NSAIDs and their potential effects on kidney and stomach.    OBESITY MANAGEMENT:  - Discussed importance of maintaining current weight.  - Patient to maintain current weight; avoid weight gain.  - Patient to continue home exercises and walking for physical activity.    DIETARY COUNSELING:  - Explained the benefits of healthy eating habits: drinking water, eating fruits, vegetables, and lean meats.  - Discussed the potential negative impacts of eating late at night.  - Recommend implementing healthy eating habits: increase water intake, consume fruits, vegetables, and lean meats.  - Patient to avoid eating late at night.    BREAST CANCER SCREENING:  - Diagnostic bilateral mammogram ordered for January.  - Follow up in January for diagnostic bilateral mammogram.    LUNG CANCER SCREENING:  - Low-dose CT lung screening ordered for January.  - Follow up in January for low-dose CT lung screening.    COLON CANCER SCREENING:  - Complete Cologuard test for colon cancer screening when received at home.    FOLLOW-UP:  - Expect calls from imaging center and urology to set up appointments.           Mily was seen today for thyroid check up.    Diagnoses and all orders for this visit:    Acquired hypothyroidism  -     TSH; Future  -     TSH    Solitary pulmonary nodule    Panic anxiety syndrome  -     ALPRAZolam (XANAX) 0.5 MG tablet; Take 1 tablet (0.5 mg total) by mouth 2 (two) times daily as needed for Anxiety.    Inflammatory spondylopathy of cervical region  -     ibuprofen (ADVIL,MOTRIN) 800 MG tablet; Take 1 tablet (800 mg total) by mouth 3 (three) times daily.    Lumbosacral spondylosis without myelopathy  -     ibuprofen (ADVIL,MOTRIN) 800 MG tablet; Take 1 tablet (800 mg total) by mouth 3 (three) times daily.    Colon cancer screening  -     Cologuard Screening (Multitarget Stool DNA); Future  -     Cologuard Screening (Multitarget Stool DNA)    Sleep disturbance    Abnormal mammogram  -     Mammo Digital Diagnostic Bilat with [facility]; Future    Urinary retention  -     : Ambulatory referral/consult to Urology; Future    Lung nodule  -     : CT Chest Lung Screening Low Dose; Future    Former smoker  -      CT Chest Lung Screening Low Dose; Future    Other nonspecific abnormal finding of lung field  -     CT Chest Low Dose Diagnostic; Future         Follow up in about 6 months (around 6/5/2025) for Annual Physical.    This note was generated with the assistance of ambient listening technology. Verbal consent was obtained by the patient and accompanying visitor(s) for the recording of patient appointment to facilitate this note. I attest to having reviewed and edited the generated note for accuracy, though some syntax or spelling errors may persist. Please contact the author of this note for any clarification.

## 2024-12-06 DIAGNOSIS — R92.8 ABNORMAL MAMMOGRAM: Primary | ICD-10-CM

## 2024-12-06 LAB — TSH SERPL-ACNC: 2.51 MIU/L (ref 0.4–4.5)

## 2024-12-16 ENCOUNTER — TELEPHONE (OUTPATIENT)
Dept: FAMILY MEDICINE | Facility: CLINIC | Age: 64
End: 2024-12-16
Payer: MEDICAID

## 2024-12-16 NOTE — TELEPHONE ENCOUNTER
Spoke with patient visit scheduled.  Informed patient will call  once lab result is available.  Patient verbalized understanding -DN

## 2024-12-16 NOTE — TELEPHONE ENCOUNTER
----- Message from Med Assistant Morrow sent at 12/13/2024 11:54 AM CST -----  Pt forgot to schedule her next apt after leaving her apt. Also calling for results of labs       Pt # [phone]

## 2025-01-14 ENCOUNTER — TELEPHONE (OUTPATIENT)
Dept: FAMILY MEDICINE | Facility: CLINIC | Age: 65
End: 2025-01-14
Payer: MEDICAID

## 2025-01-14 NOTE — TELEPHONE ENCOUNTER
----- Message from Carmencita sent at 1/14/2025  1:48 PM CST -----  Vm- 1:32- pt needs the contact to get a mammogram and xray of her lungs   [phone]

## 2025-01-16 ENCOUNTER — TELEPHONE (OUTPATIENT)
Dept: FAMILY MEDICINE | Facility: CLINIC | Age: 65
End: 2025-01-16
Payer: MEDICAID

## 2025-01-16 DIAGNOSIS — R33.9 URINARY RETENTION: Primary | ICD-10-CM

## 2025-01-16 NOTE — TELEPHONE ENCOUNTER
----- Message from Carmencita sent at 1/16/2025  3:35 PM CST -----  Pt found a urologist clinic, do not put in a specific doctor name   Fax [phone]

## 2025-01-16 NOTE — TELEPHONE ENCOUNTER
----- Message from Carmencita sent at 1/16/2025  2:21 PM CST -----  Vm- 2:00-pt is calling about the urology appointment   [phone]

## 2025-01-16 NOTE — TELEPHONE ENCOUNTER
----- Message from Inga sent at 1/16/2025  2:25 PM CST -----  Patient was referred to go to see a urologist in [place]. Asking for a local urologist if possible.   [phone]

## 2025-02-03 ENCOUNTER — HOSPITAL ENCOUNTER (OUTPATIENT)
Dept: RADIOLOGY | Facility: HOSPITAL | Age: 65
Discharge: HOME | End: 2025-02-03
Attending: FAMILY MEDICINE
Payer: MEDICAID

## 2025-02-03 DIAGNOSIS — R92.8 ABNORMAL MAMMOGRAM: ICD-10-CM

## 2025-02-03 DIAGNOSIS — R91.8 OTHER NONSPECIFIC ABNORMAL FINDING OF LUNG FIELD: ICD-10-CM

## 2025-02-03 PROCEDURE — 71250 CT THORAX DX C-: CPT | Mod: TC,PO

## 2025-02-03 PROCEDURE — 77065 DX MAMMO INCL CAD UNI: CPT | Mod: 26,LT,, | Performed by: RADIOLOGY

## 2025-02-03 PROCEDURE — 71250 CT THORAX DX C-: CPT | Mod: 26,,, | Performed by: RADIOLOGY

## 2025-02-03 PROCEDURE — 77061 BREAST TOMOSYNTHESIS UNI: CPT | Mod: 26,LT,, | Performed by: RADIOLOGY

## 2025-02-03 PROCEDURE — 77065 DX MAMMO INCL CAD UNI: CPT | Mod: TC,PO,LT

## 2025-02-05 DIAGNOSIS — Z12.31 ENCOUNTER FOR SCREENING MAMMOGRAM FOR MALIGNANT NEOPLASM OF BREAST: Primary | ICD-10-CM

## 2025-02-05 DIAGNOSIS — R91.8 OTHER NONSPECIFIC ABNORMAL FINDING OF LUNG FIELD: Primary | ICD-10-CM

## 2025-02-05 NOTE — PROGRESS NOTES
Please call the patient regarding her abnormal result.  CT of the chest demonstrated a new grouping of nodules in the left lung.  It is recommended that CT be repeated in 3-6 months for re-evaluation.  Of note however she had a spot in the lower left lung previously that has resolved.

## 2025-02-06 ENCOUNTER — TELEPHONE (OUTPATIENT)
Dept: FAMILY MEDICINE | Facility: CLINIC | Age: 65
End: 2025-02-06
Payer: MEDICAID

## 2025-02-06 NOTE — TELEPHONE ENCOUNTER
"Spoke to patient with result. I changed the verbiage to "within normal limits". If that is incorrect let me know. Aware to schedule screening mammo in July. Patient had ov scheduled 6/4 and 6/5. Said she wants to keep 6/4. Canceled 6/5  "

## 2025-02-06 NOTE — PROGRESS NOTES
Please inform patient ultrasound with inappropriate limits.  Her regular screening mammogram is due in July it has been ordered.

## 2025-02-06 NOTE — TELEPHONE ENCOUNTER
----- Message from Donald Isaacs MD sent at 2/5/2025  5:57 PM CST -----  Please call the patient regarding her abnormal result.  CT of the chest demonstrated a new grouping of nodules in the left lung.  It is recommended that CT be repeated in 3-6 months for re-evaluation.  Of note however she had a spot in the lower left lung previously that has resolved.

## 2025-02-06 NOTE — TELEPHONE ENCOUNTER
----- Message from Donald Isaacs MD sent at 2/5/2025  6:00 PM CST -----  Please inform patient ultrasound with inappropriate limits.  Her regular screening mammogram is due in July it has been ordered.

## 2025-02-19 ENCOUNTER — TELEPHONE (OUTPATIENT)
Dept: FAMILY MEDICINE | Facility: CLINIC | Age: 65
End: 2025-02-19
Payer: MEDICAID

## 2025-02-19 NOTE — TELEPHONE ENCOUNTER
Per dr pak: ok.      Patient ntoified, verbalized understanding.  Patient also wants to know if she can do this once monthly, and if adding a berberine supplement would be ok?

## 2025-02-19 NOTE — TELEPHONE ENCOUNTER
----- Message from Earline sent at 2/19/2025  1:38 PM CST -----  Vm:130Pt would like know if it is safe for her to do a 3 day fasting with her thyroid medication. Pt would like a call back.[phone]

## 2025-04-01 DIAGNOSIS — F41.0 PANIC ANXIETY SYNDROME: ICD-10-CM

## 2025-04-01 RX ORDER — ALPRAZOLAM 0.5 MG/1
0.5 TABLET ORAL 2 TIMES DAILY PRN
Qty: 60 TABLET | Refills: 1 | Status: SHIPPED | OUTPATIENT
Start: 2025-04-01

## 2025-04-01 NOTE — TELEPHONE ENCOUNTER
prescription sent to   [id number] - [place], [place] - [address]  AT [facility]  [address] [facility] [address]  Phone: [phone] Fax: [phone]

## 2025-04-01 NOTE — TELEPHONE ENCOUNTER
----- Message from Earline sent at 4/1/2025  1:35 PM CDT -----  Vm:129Pt called to get a refill.[phone]

## 2025-05-01 ENCOUNTER — TELEPHONE (OUTPATIENT)
Dept: FAMILY MEDICINE | Facility: CLINIC | Age: 65
End: 2025-05-01
Payer: MEDICAID

## 2025-05-01 DIAGNOSIS — F41.0 PANIC ANXIETY SYNDROME: ICD-10-CM

## 2025-05-01 RX ORDER — ALPRAZOLAM 0.5 MG/1
0.5 TABLET ORAL 2 TIMES DAILY PRN
Qty: 60 TABLET | Refills: 1 | Status: SHIPPED | OUTPATIENT
Start: 2025-05-01

## 2025-05-01 NOTE — TELEPHONE ENCOUNTER
----- Message from Earline sent at 5/1/2025  1:47 PM CDT -----  Vm:120Pt called to cancel refill request.[phone]

## 2025-05-01 NOTE — TELEPHONE ENCOUNTER
prescription sent to   [id number] - [place], [place] - [address]  AT [facility]  [address] [facility] [address]  Phone: [phone] Fax: [phone]

## 2025-05-01 NOTE — TELEPHONE ENCOUNTER
----- Message from Carmencita sent at 5/1/2025 12:59 PM CDT -----  [facility] [phone]

## 2025-05-07 ENCOUNTER — TELEPHONE (OUTPATIENT)
Dept: FAMILY MEDICINE | Facility: CLINIC | Age: 65
End: 2025-05-07
Payer: MEDICAID

## 2025-05-07 NOTE — TELEPHONE ENCOUNTER
Spoke with patient notified of results below.  Patient verbalized understanding.  Informed to return call with questions/concerns -DN

## 2025-05-07 NOTE — TELEPHONE ENCOUNTER
----- Message from Dana Mendozaissa sent at 5/6/2025  7:42 AM CDT -----    ----- Message -----  From: Aruna Wild LPN  Sent: 5/6/2025  12:00 AM CDT  To: Donald Isaacs Staff    ----- Message from Donald Isaacs MD sent at 2/5/2025  5:57 PM CST -----  Please call the patient regarding her abnormal result.  CT of the chest demonstrated a new grouping of nodules in the left lung.  It is recommended that CT be repeated in 3-6 months for re-evaluation.  Of note however she had a spot in the lower left lung previously that has resolved.

## 2025-05-12 ENCOUNTER — TELEPHONE (OUTPATIENT)
Dept: FAMILY MEDICINE | Facility: CLINIC | Age: 65
End: 2025-05-12
Payer: MEDICAID

## 2025-05-12 NOTE — TELEPHONE ENCOUNTER
----- Message from Carmencita sent at 5/12/2025  2:56 PM CDT -----  - 2:54-pt is calling back about the test that was ordered. She called [facility] and they said they have no order [phone]

## 2025-05-12 NOTE — TELEPHONE ENCOUNTER
Spoke with pt states she called [facility] and they did not have her order  Advised pt that las CT was done at [facility]. She wanted to switch facilities? Pt declined. States she needs number to [facility] gave pt contact info

## 2025-05-21 ENCOUNTER — TELEPHONE (OUTPATIENT)
Dept: FAMILY MEDICINE | Facility: CLINIC | Age: 65
End: 2025-05-21
Payer: MEDICAID

## 2025-05-28 ENCOUNTER — HOSPITAL ENCOUNTER (OUTPATIENT)
Dept: RADIOLOGY | Facility: HOSPITAL | Age: 65
Discharge: HOME | End: 2025-05-28
Attending: FAMILY MEDICINE
Payer: MEDICAID

## 2025-05-28 DIAGNOSIS — R91.8 OTHER NONSPECIFIC ABNORMAL FINDING OF LUNG FIELD: ICD-10-CM

## 2025-05-28 PROCEDURE — 71250 CT THORAX DX C-: CPT | Mod: TC,PO

## 2025-05-28 PROCEDURE — 71250 CT THORAX DX C-: CPT | Mod: 26,,, | Performed by: RADIOLOGY

## 2025-06-04 ENCOUNTER — OFFICE VISIT (OUTPATIENT)
Dept: FAMILY MEDICINE | Facility: CLINIC | Age: 65
End: 2025-06-04
Payer: MEDICAID

## 2025-06-04 VITALS
HEIGHT: 63 IN | DIASTOLIC BLOOD PRESSURE: 74 MMHG | OXYGEN SATURATION: 99 % | SYSTOLIC BLOOD PRESSURE: 112 MMHG | HEART RATE: 55 BPM | BODY MASS INDEX: 25.69 KG/M2 | WEIGHT: 145 LBS

## 2025-06-04 DIAGNOSIS — R91.8 MULTIPLE PULMONARY NODULES DETERMINED BY COMPUTED TOMOGRAPHY OF LUNG: ICD-10-CM

## 2025-06-04 DIAGNOSIS — F41.0 PANIC ANXIETY SYNDROME: ICD-10-CM

## 2025-06-04 DIAGNOSIS — R39.9 URINARY SYMPTOM OR SIGN: ICD-10-CM

## 2025-06-04 DIAGNOSIS — E03.9 ACQUIRED HYPOTHYROIDISM: Primary | ICD-10-CM

## 2025-06-04 DIAGNOSIS — Z87.891 FORMER SMOKER: ICD-10-CM

## 2025-06-04 DIAGNOSIS — M47.817 LUMBOSACRAL SPONDYLOSIS WITHOUT MYELOPATHY: ICD-10-CM

## 2025-06-04 DIAGNOSIS — Z51.81 ENCOUNTER FOR THERAPEUTIC DRUG MONITORING: ICD-10-CM

## 2025-06-04 DIAGNOSIS — Z79.899 ENCOUNTER FOR LONG-TERM CURRENT USE OF HIGH RISK MEDICATION: ICD-10-CM

## 2025-06-04 DIAGNOSIS — Z12.11 COLON CANCER SCREENING: ICD-10-CM

## 2025-06-04 PROCEDURE — 1159F MED LIST DOCD IN RCRD: CPT | Mod: CPTII,S$GLB,, | Performed by: FAMILY MEDICINE

## 2025-06-04 PROCEDURE — 3078F DIAST BP <80 MM HG: CPT | Mod: CPTII,S$GLB,, | Performed by: FAMILY MEDICINE

## 2025-06-04 PROCEDURE — 3008F BODY MASS INDEX DOCD: CPT | Mod: CPTII,S$GLB,, | Performed by: FAMILY MEDICINE

## 2025-06-04 PROCEDURE — 1160F RVW MEDS BY RX/DR IN RCRD: CPT | Mod: CPTII,S$GLB,, | Performed by: FAMILY MEDICINE

## 2025-06-04 PROCEDURE — 99214 OFFICE O/P EST MOD 30 MIN: CPT | Mod: S$GLB,,, | Performed by: FAMILY MEDICINE

## 2025-06-04 PROCEDURE — 3074F SYST BP LT 130 MM HG: CPT | Mod: CPTII,S$GLB,, | Performed by: FAMILY MEDICINE

## 2025-06-04 RX ORDER — LEVOTHYROXINE SODIUM 50 UG/1
50 TABLET ORAL NIGHTLY
Qty: 30 TABLET | Refills: 11 | Status: SHIPPED | OUTPATIENT
Start: 2025-06-04 | End: 2026-06-04

## 2025-06-04 RX ORDER — ALPRAZOLAM 0.5 MG/1
0.5 TABLET ORAL 2 TIMES DAILY PRN
Qty: 60 TABLET | Refills: 2 | Status: SHIPPED | OUTPATIENT
Start: 2025-07-01

## 2025-07-24 ENCOUNTER — TELEPHONE (OUTPATIENT)
Dept: FAMILY MEDICINE | Facility: CLINIC | Age: 65
End: 2025-07-24
Payer: MEDICAID

## 2025-07-24 NOTE — TELEPHONE ENCOUNTER
Carmencita Andres Staff  Caller: Unspecified (Today,  3:04 PM)  Vm- 2:41- pt is calling back  [phone]

## 2025-08-06 ENCOUNTER — OFFICE VISIT (OUTPATIENT)
Dept: FAMILY MEDICINE | Facility: CLINIC | Age: 65
End: 2025-08-06
Payer: MEDICAID

## 2025-08-06 VITALS
BODY MASS INDEX: 25.87 KG/M2 | HEART RATE: 63 BPM | SYSTOLIC BLOOD PRESSURE: 118 MMHG | HEIGHT: 63 IN | DIASTOLIC BLOOD PRESSURE: 85 MMHG | OXYGEN SATURATION: 98 % | WEIGHT: 146 LBS

## 2025-08-06 DIAGNOSIS — Z12.11 SCREENING FOR MALIGNANT NEOPLASM OF COLON: ICD-10-CM

## 2025-08-06 DIAGNOSIS — Z01.419 WELL WOMAN EXAM WITH ROUTINE GYNECOLOGICAL EXAM: Primary | ICD-10-CM

## 2025-08-06 DIAGNOSIS — E03.9 ACQUIRED HYPOTHYROIDISM: ICD-10-CM

## 2025-08-06 DIAGNOSIS — F41.0 PANIC ANXIETY SYNDROME: ICD-10-CM

## 2025-08-06 DIAGNOSIS — R91.8 MULTIPLE PULMONARY NODULES DETERMINED BY COMPUTED TOMOGRAPHY OF LUNG: ICD-10-CM

## 2025-08-06 RX ORDER — ALPRAZOLAM 0.5 MG/1
0.5 TABLET ORAL 2 TIMES DAILY PRN
Qty: 60 TABLET | Refills: 4 | Status: SHIPPED | OUTPATIENT
Start: 2025-08-06

## 2025-08-06 NOTE — PROGRESS NOTES
SUBJECTIVE:   HPI: Mily Long  is a 65 y.o. female who presents for annual physical .   Gynecologic Exam (Pap smear//no med bottles//cologuard reordered//tc)    History of Present Illness    CHIEF COMPLAINT:  Patient presents today for well woman exam with pap smear    GYNECOLOGICAL HISTORY:  She is postmenopausal and denies history of abnormal pap smears, vaginal discharge, irritation, or inflammation.    LABS / TEST RESULTS:  Recent lab results show thyroid function within normal limits, normal complete blood count, and unremarkable liver and kidney function tests. Blood sugar was slightly elevated, potentially related to recent chocolate consumption.    IMAGING:  She declined recommended PET scan due to concerns about radiation exposure, expressing preference for CT instead. She has a history of multiple lung nodules with resolution noted on left side. Previous MRI revealed herniated and bulging disc in cervical and lumbar spine.    CURRENT MEDICATIONS:  She reports discontinuing most vitamin supplements and currently takes B12, magnesium, and apple cider supplement. She is transitioning to natural food sources for iron, potassium, and calcium, including cucumbers, tomatoes, melons, and avocados. She discontinued fish oil due to previous odor issues and now obtains omega-3 through dietary sources like sardines and tuna. She is switching back to dark chocolate from milk chocolate, noting dark chocolate's iron content.    ALLERGIES:  She reports an allergy to pain medication, specifics not further clarified. She is unsure about iodine allergy.       Office Visit on 06/04/2025   Component Date Value Ref Range Status    Glucose 08/04/2025 108 (H)  65 - 99 mg/dL Final    BUN 08/04/2025 13  7 - 25 mg/dL Final    Creatinine 08/04/2025 0.97  0.50 - 1.05 mg/dL Final    eGFR 08/04/2025 65  > OR = 60 mL/min/1.73m2 Final    BUN/Creatinine Ratio 08/04/2025 SEE NOTE:  6 - 22 (calc) Final    Sodium 08/04/2025 138  135 -  146 mmol/L Final    Potassium 08/04/2025 4.6  3.5 - 5.3 mmol/L Final    Chloride 08/04/2025 103  98 - 110 mmol/L Final    CO2 08/04/2025 28  20 - 32 mmol/L Final    Calcium 08/04/2025 8.8  8.6 - 10.4 mg/dL Final    Total Protein 08/04/2025 6.7  6.1 - 8.1 g/dL Final    Albumin 08/04/2025 4.0  3.6 - 5.1 g/dL Final    Globulin, Total 08/04/2025 2.7  1.9 - 3.7 g/dL (calc) Final    Albumin/Globulin Ratio 08/04/2025 1.5  1.0 - 2.5 (calc) Final    Total Bilirubin 08/04/2025 0.7  0.2 - 1.2 mg/dL Final    Alkaline Phosphatase 08/04/2025 100  37 - 153 U/L Final    AST 08/04/2025 22  10 - 35 U/L Final    ALT 08/04/2025 15  6 - 29 U/L Final    WBC 08/04/2025 6.2  3.8 - 10.8 Thousand/uL Final    RBC 08/04/2025 4.34  3.80 - 5.10 Million/uL Final    Hemoglobin 08/04/2025 12.4  11.7 - 15.5 g/dL Final    Hematocrit 08/04/2025 38.9  35.0 - 45.0 % Final    MCV 08/04/2025 89.6  80.0 - 100.0 fL Final    MCH 08/04/2025 28.6  27.0 - 33.0 pg Final    MCHC 08/04/2025 31.9 (L)  32.0 - 36.0 g/dL Final    RDW 08/04/2025 12.5  11.0 - 15.0 % Final    Platelets 08/04/2025 278  140 - 400 Thousand/uL Final    MPV 08/04/2025 10.3  7.5 - 12.5 fL Final    Neutrophils, Abs 08/04/2025 3,212  1,500 - 7,800 cells/uL Final    Lymph # 08/04/2025 2,021  850 - 3,900 cells/uL Final    Mono # 08/04/2025 806  200 - 950 cells/uL Final    Eos # 08/04/2025 93  15 - 500 cells/uL Final    Baso # 08/04/2025 68  0 - 200 cells/uL Final    Neutrophils Relative 08/04/2025 51.8  % Final    Lymph % 08/04/2025 32.6  % Final    Mono % 08/04/2025 13.0  % Final    Eosinophil % 08/04/2025 1.5  % Final    Basophil % 08/04/2025 1.1  % Final    TSH 08/04/2025 2.38  0.40 - 4.50 mIU/L Final    Amphetamines 08/04/2025 NEGATIVE  <500 ng/mL Final    Barbiturates 08/04/2025 NEGATIVE  <300 ng/mL Final    Benzodiazepines 08/04/2025 POSITIVE (A)  <100 ng/mL Final    Alphahydroxyalprazolam 08/04/2025 602 (H)  <25 ng/mL Final    Alphahydroxymidazolam 08/04/2025 NEGATIVE  <50 ng/mL Final     Alphahydroxytriazolam 2025 NEGATIVE  <50 ng/mL Final    Aminoclonazepam 2025 NEGATIVE  <25 ng/mL Final    hydroxyethylflurazepam UR GC/MS 2025 NEGATIVE  <50 ng/mL Final    Lorazepam 2025 NEGATIVE  <50 ng/mL Final    Nordiazepam Lvl 2025 NEGATIVE  <50 ng/mL Final    Oxazepam 2025 NEGATIVE  <50 ng/mL Final    Temazepam GC/MS Conf 2025 NEGATIVE  <50 ng/mL Final    Benzodiazepines Comments 2025 SEE COMMENT   Final    Cocaine Metabolites 2025 NEGATIVE  <150 ng/mL Final    Methadone 2025 NEGATIVE  <100 ng/mL Final    Opiates 2025 NEGATIVE  <100 ng/mL Final    Oxycodone 2025 NEGATIVE  <100 ng/mL Final    Phencyclidine 2025 NEGATIVE  <25 ng/mL Final    Creatinine 2025 164.3  > or = 20.0 mg/dL Final    pH 2025 5.2  4.5 - 9.0 Final    Oxidants, Urine (Tox) 2025 NEGATIVE  <200 mcg/mL Final    Notes and Comments 2025 SEE COMMENT   Final      (Not in a hospital admission)    Review of patient's allergies indicates:   Allergen Reactions    Mucinex fast-max eliceo-ha (dm) [phenylephrine-dm-acetaminophen] Hallucinations    Trazodone Hallucinations     Nightmares also    Codeine Nausea Only    Hydrocodone Nausea Only     Medications Ordered Prior to Encounter[1]  Past Medical History:   Diagnosis Date    DDD (degenerative disc disease), cervical     DDD (degenerative disc disease), lumbar     Insomnia     Panic anxiety syndrome     Spondyloarthritis      Past Surgical History:   Procedure Laterality Date     SECTION       Family History   Problem Relation Name Age of Onset    Diabetes Father       Social History[2]   Health Maintenance Topics with due status: Not Due       Topic Last Completion Date    Hemoglobin A1c (Diabetic Prevention Screening) 08/10/2023    Lipid Panel 08/10/2023    Mammogram 2025    LDCT Lung Screen 2025    Influenza Vaccine Not Due       There is no immunization history on file for this  "patient.    Review of Systems   Constitutional:  Negative for activity change, fatigue and unexpected weight change.   HENT:  Negative for hearing loss, postnasal drip, sinus pressure, sore throat and voice change.    Eyes:  Negative for photophobia and visual disturbance.   Respiratory:  Negative for cough, shortness of breath and wheezing.    Cardiovascular:  Negative for chest pain and palpitations.   Gastrointestinal:  Negative for constipation, diarrhea and nausea.   Genitourinary:  Negative for difficulty urinating, frequency, hematuria and urgency.   Musculoskeletal:  Negative for arthralgias and back pain.   Skin:  Negative for rash.   Neurological:  Negative for weakness, light-headedness and headaches.   Hematological:  Negative for adenopathy. Does not bruise/bleed easily.   Psychiatric/Behavioral:  The patient is not nervous/anxious.       OBJECTIVE:          6/4/2025     3:03 PM 8/6/2025    10:50 AM   Vitals - 1 value per visit   SYSTOLIC 112 118   DIASTOLIC 74 85   Pulse 55 63   SPO2 99 % 98 %   Weight (lb) 145 146   Weight (kg) 65.772 66.225   Height 5' 3" (1.6 m) 5' 3" (1.6 m)   BMI (Calculated) 25.7 25.9      Physical Exam  Constitutional:       Appearance: Normal appearance.   HENT:      Head: Normocephalic and atraumatic.      Mouth/Throat:      Mouth: Mucous membranes are moist.   Eyes:      Conjunctiva/sclera: Conjunctivae normal.   Pulmonary:      Effort: Pulmonary effort is normal.   Genitourinary:     General: Normal vulva.      Vagina: Normal.      Cervix: Normal.      Uterus: Normal.       Adnexa: Right adnexa normal and left adnexa normal.      Rectum: Normal.   Neurological:      General: No focal deficit present.      Mental Status: She is alert and oriented to person, place, and time.   Psychiatric:         Mood and Affect: Mood normal.         Behavior: Behavior normal.          Assessment:       LUNG NODULES:  - Discussed lung nodules found on low dose CT.  - Ordered CT Chest W " Contrast for further evaluation.  - Patient instructed to await call for scheduling.    HYPOTHYROIDISM  -continue current dose levothyroxine    MENOPAUSAL STATE:  - Conducted well woman exam with pap smear for this postmenopausal patient with no history of abnormal pap smears.  - Performed manual pelvic exam to assess uterus size and cervix.    ANXIETY  -continue xanax  -UDS consistent with use    CANCER SCREENING:  - Ordered Cologuard test for colon cancer screening.    FOLLOW-UP:  - Follow up as scheduled.    OTHER LAB RESULTS:  - Reviewed recent lab results: thyroid, blood count, liver, and renal function all normal.           Plan:       Well woman exam with routine gynecological exam  -     Cancel: ThinPrep Img Pap/HPV mRNA E6/E7 Ch, Gono; Future; Expected date: 08/06/2025  -     ThinPrep Img Pap/HPV mRNA E6/E7 Ch, Gono    Acquired hypothyroidism    Screening for malignant neoplasm of colon  -     Cologuard Screening (Multitarget Stool DNA); Future; Expected date: 08/06/2025    Multiple pulmonary nodules determined by computed tomography of lung  -     CT Chest With Contrast; Future; Expected date: 08/06/2025    Panic anxiety syndrome  -     ALPRAZolam (XANAX) 0.5 MG tablet; Take 1 tablet (0.5 mg total) by mouth 2 (two) times daily as needed for Anxiety.  Dispense: 60 tablet; Refill: 4        Counseled on age and gender appropriate medical preventative services, including cancer screenings, immunizations, overall nutritional health, need for a consistent exercise regimen and an overall push towards maintaining a vigorous and active lifestyle.      Follow up in about 6 months (around 2/6/2026).        This note was generated with the assistance of ambient listening technology. Verbal consent was obtained by the patient and accompanying visitor(s) for the recording of patient appointment to facilitate this note. I attest to having reviewed and edited the generated note for accuracy, though some syntax or spelling  errors may persist. Please contact the author of this note for any clarification.                   [1]   Current Outpatient Medications on File Prior to Visit   Medication Sig Dispense Refill    albuterol (PROAIR HFA) 90 mcg/actuation inhaler Inhale 2 puffs into the lungs every 6 (six) hours as needed for Wheezing. Rescue 8 g 0    ibuprofen (ADVIL,MOTRIN) 800 MG tablet Take 1 tablet (800 mg total) by mouth 3 (three) times daily. 90 tablet 3    levothyroxine (SYNTHROID) 50 MCG tablet Take 1 tablet (50 mcg total) by mouth nightly. 30 tablet 11    [DISCONTINUED] ALPRAZolam (XANAX) 0.5 MG tablet Take 1 tablet (0.5 mg total) by mouth 2 (two) times daily as needed for Anxiety. 60 tablet 2     No current facility-administered medications on file prior to visit.   [2]   Social History  Tobacco Use    Smoking status: Former     Current packs/day: 0.00     Average packs/day: 1 pack/day for 35.0 years (35.0 ttl pk-yrs)     Types: Cigarettes     Start date:      Quit date: 2017     Years since quittin.6    Smokeless tobacco: Never   Substance Use Topics    Alcohol use: Not Currently    Drug use: Never

## 2025-08-07 LAB
C TRACH RRNA SPEC QL NAA+PROBE: NOT DETECTED
CLINICAL INFO: NORMAL
COMMENT: NORMAL
CYTO CVX: NORMAL
CYTOLOGIST CVX/VAG CYTO: NORMAL
CYTOLOGIST CVX/VAG CYTO: NORMAL
CYTOLOGY CMNT CVX/VAG CYTO-IMP: NORMAL
DATE OF PREVIOUS PAP: NORMAL
DATE PREVIOUS BX: NORMAL
GEN CATEG CVX/VAG CYTO-IMP: NORMAL
HPV E6+E7 MRNA CVX QL NAA+PROBE: NORMAL
LMP START DATE: NORMAL
MICROORGANISM CVX/VAG CYTO: NORMAL
N GONORRHOEA RRNA SPEC QL NAA+PROBE: NOT DETECTED
PATHOLOGIST CVX/VAG CYTO: NORMAL
SERVICE CMNT-IMP: NORMAL
SPECIMEN SOURCE CVX/VAG CYTO: NORMAL
STAT OF ADQ CVX/VAG CYTO-IMP: NORMAL

## 2025-08-12 ENCOUNTER — RESULTS FOLLOW-UP (OUTPATIENT)
Dept: FAMILY MEDICINE | Facility: CLINIC | Age: 65
End: 2025-08-12
Payer: MEDICAID

## 2025-08-12 ENCOUNTER — TELEPHONE (OUTPATIENT)
Dept: FAMILY MEDICINE | Facility: CLINIC | Age: 65
End: 2025-08-12
Payer: MEDICAID

## 2025-08-12 DIAGNOSIS — N76.0 BV (BACTERIAL VAGINOSIS): Primary | ICD-10-CM

## 2025-08-12 DIAGNOSIS — B96.89 BV (BACTERIAL VAGINOSIS): Primary | ICD-10-CM

## 2025-08-12 RX ORDER — METRONIDAZOLE 500 MG/1
500 TABLET ORAL EVERY 12 HOURS
Qty: 14 TABLET | Refills: 0 | Status: SHIPPED | OUTPATIENT
Start: 2025-08-12 | End: 2025-08-19

## 2025-08-13 ENCOUNTER — HOSPITAL ENCOUNTER (OUTPATIENT)
Dept: RADIOLOGY | Facility: HOSPITAL | Age: 65
Discharge: HOME | End: 2025-08-13
Attending: FAMILY MEDICINE
Payer: MEDICAID

## 2025-08-13 DIAGNOSIS — R91.8 MULTIPLE PULMONARY NODULES DETERMINED BY COMPUTED TOMOGRAPHY OF LUNG: ICD-10-CM

## 2025-08-13 PROCEDURE — 71260 CT THORAX DX C+: CPT | Mod: TC,PO

## 2025-08-13 PROCEDURE — 71260 CT THORAX DX C+: CPT | Mod: 26,,, | Performed by: RADIOLOGY

## 2025-08-13 PROCEDURE — 25500020 PHARM REV CODE 255: Mod: PO | Performed by: FAMILY MEDICINE

## 2025-08-13 RX ADMIN — IOHEXOL 100 ML: 350 INJECTION, SOLUTION INTRAVENOUS at 01:08
